# Patient Record
Sex: MALE | Race: WHITE | NOT HISPANIC OR LATINO | Employment: UNEMPLOYED | URBAN - METROPOLITAN AREA
[De-identification: names, ages, dates, MRNs, and addresses within clinical notes are randomized per-mention and may not be internally consistent; named-entity substitution may affect disease eponyms.]

---

## 2024-01-15 ENCOUNTER — HOSPITAL ENCOUNTER (INPATIENT)
Facility: HOSPITAL | Age: 69
LOS: 3 days | Discharge: HOME | DRG: 871 | End: 2024-01-19
Attending: EMERGENCY MEDICINE | Admitting: INTERNAL MEDICINE
Payer: MEDICARE

## 2024-01-15 DIAGNOSIS — L02.31 ABSCESS OF BUTTOCK: ICD-10-CM

## 2024-01-15 DIAGNOSIS — R50.9 FEVER: ICD-10-CM

## 2024-01-15 DIAGNOSIS — I71.43 INFRARENAL ABDOMINAL AORTIC ANEURYSM (AAA) WITHOUT RUPTURE: Chronic | ICD-10-CM

## 2024-01-15 DIAGNOSIS — L03.317 CELLULITIS AND ABSCESS OF BUTTOCK: ICD-10-CM

## 2024-01-15 DIAGNOSIS — J18.9 PNEUMONIA OF LEFT UPPER LOBE DUE TO INFECTIOUS ORGANISM: Primary | ICD-10-CM

## 2024-01-15 DIAGNOSIS — L02.31 CELLULITIS AND ABSCESS OF BUTTOCK: ICD-10-CM

## 2024-01-15 DIAGNOSIS — R07.9 CHEST PAIN: ICD-10-CM

## 2024-01-15 LAB
ALBUMIN SERPL BCP-MCNC: 2.9 G/DL (ref 3.5–5.2)
ALP SERPL-CCNC: 88 U/L (ref 55–135)
ALT SERPL W/O P-5'-P-CCNC: 13 U/L (ref 10–44)
ANION GAP SERPL CALC-SCNC: 12 MMOL/L (ref 8–16)
AST SERPL-CCNC: 14 U/L (ref 10–40)
BASOPHILS # BLD AUTO: 0.07 K/UL (ref 0–0.2)
BASOPHILS NFR BLD: 0.3 % (ref 0–1.9)
BILIRUB SERPL-MCNC: 1.1 MG/DL (ref 0.1–1)
BUN SERPL-MCNC: 12 MG/DL (ref 8–23)
CALCIUM SERPL-MCNC: 9.1 MG/DL (ref 8.7–10.5)
CHLORIDE SERPL-SCNC: 99 MMOL/L (ref 95–110)
CO2 SERPL-SCNC: 21 MMOL/L (ref 23–29)
CREAT SERPL-MCNC: 0.9 MG/DL (ref 0.5–1.4)
CTP QC/QA: YES
CTP QC/QA: YES
DIFFERENTIAL METHOD BLD: ABNORMAL
EOSINOPHIL # BLD AUTO: 0 K/UL (ref 0–0.5)
EOSINOPHIL NFR BLD: 0.2 % (ref 0–8)
ERYTHROCYTE [DISTWIDTH] IN BLOOD BY AUTOMATED COUNT: 13.4 % (ref 11.5–14.5)
EST. GFR  (NO RACE VARIABLE): >60 ML/MIN/1.73 M^2
GLUCOSE SERPL-MCNC: 140 MG/DL (ref 70–110)
HCT VFR BLD AUTO: 36.7 % (ref 40–54)
HGB BLD-MCNC: 12.3 G/DL (ref 14–18)
IMM GRANULOCYTES # BLD AUTO: 0.17 K/UL (ref 0–0.04)
IMM GRANULOCYTES NFR BLD AUTO: 0.7 % (ref 0–0.5)
LACTATE SERPL-SCNC: 1.1 MMOL/L (ref 0.5–2.2)
LYMPHOCYTES # BLD AUTO: 0.9 K/UL (ref 1–4.8)
LYMPHOCYTES NFR BLD: 3.8 % (ref 18–48)
MCH RBC QN AUTO: 30 PG (ref 27–31)
MCHC RBC AUTO-ENTMCNC: 33.5 G/DL (ref 32–36)
MCV RBC AUTO: 90 FL (ref 82–98)
MONOCYTES # BLD AUTO: 1.2 K/UL (ref 0.3–1)
MONOCYTES NFR BLD: 5.1 % (ref 4–15)
NEUTROPHILS # BLD AUTO: 21.1 K/UL (ref 1.8–7.7)
NEUTROPHILS NFR BLD: 89.9 % (ref 38–73)
NRBC BLD-RTO: 0 /100 WBC
PLATELET # BLD AUTO: 374 K/UL (ref 150–450)
PMV BLD AUTO: 10.6 FL (ref 9.2–12.9)
POC MOLECULAR INFLUENZA A AGN: NEGATIVE
POC MOLECULAR INFLUENZA B AGN: NEGATIVE
POTASSIUM SERPL-SCNC: 3.9 MMOL/L (ref 3.5–5.1)
PROCALCITONIN SERPL IA-MCNC: 0.32 NG/ML
PROT SERPL-MCNC: 7.3 G/DL (ref 6–8.4)
RBC # BLD AUTO: 4.1 M/UL (ref 4.6–6.2)
SARS-COV-2 RDRP RESP QL NAA+PROBE: NEGATIVE
SODIUM SERPL-SCNC: 132 MMOL/L (ref 136–145)
WBC # BLD AUTO: 23.41 K/UL (ref 3.9–12.7)

## 2024-01-15 PROCEDURE — 63600175 PHARM REV CODE 636 W HCPCS: Mod: ER | Performed by: FAMILY MEDICINE

## 2024-01-15 PROCEDURE — 87635 SARS-COV-2 COVID-19 AMP PRB: CPT | Mod: ER | Performed by: NURSE PRACTITIONER

## 2024-01-15 PROCEDURE — 85025 COMPLETE CBC W/AUTO DIFF WBC: CPT | Mod: ER | Performed by: NURSE PRACTITIONER

## 2024-01-15 PROCEDURE — 63600175 PHARM REV CODE 636 W HCPCS: Mod: ER | Performed by: EMERGENCY MEDICINE

## 2024-01-15 PROCEDURE — 84145 PROCALCITONIN (PCT): CPT | Mod: ER | Performed by: NURSE PRACTITIONER

## 2024-01-15 PROCEDURE — 25000003 PHARM REV CODE 250: Mod: ER | Performed by: EMERGENCY MEDICINE

## 2024-01-15 PROCEDURE — 83605 ASSAY OF LACTIC ACID: CPT | Mod: ER | Performed by: NURSE PRACTITIONER

## 2024-01-15 PROCEDURE — 87040 BLOOD CULTURE FOR BACTERIA: CPT | Performed by: NURSE PRACTITIONER

## 2024-01-15 PROCEDURE — 63600175 PHARM REV CODE 636 W HCPCS: Performed by: HOSPITALIST

## 2024-01-15 PROCEDURE — G0378 HOSPITAL OBSERVATION PER HR: HCPCS | Mod: ER

## 2024-01-15 PROCEDURE — 25000003 PHARM REV CODE 250: Mod: ER | Performed by: FAMILY MEDICINE

## 2024-01-15 PROCEDURE — 25000003 PHARM REV CODE 250: Mod: ER | Performed by: NURSE PRACTITIONER

## 2024-01-15 PROCEDURE — 0J990ZZ DRAINAGE OF BUTTOCK SUBCUTANEOUS TISSUE AND FASCIA, OPEN APPROACH: ICD-10-PCS | Performed by: INTERNAL MEDICINE

## 2024-01-15 PROCEDURE — 80053 COMPREHEN METABOLIC PANEL: CPT | Mod: ER | Performed by: NURSE PRACTITIONER

## 2024-01-15 PROCEDURE — 63600175 PHARM REV CODE 636 W HCPCS: Performed by: NURSE PRACTITIONER

## 2024-01-15 PROCEDURE — 96372 THER/PROPH/DIAG INJ SC/IM: CPT | Performed by: HOSPITALIST

## 2024-01-15 PROCEDURE — G0378 HOSPITAL OBSERVATION PER HR: HCPCS

## 2024-01-15 RX ORDER — ONDANSETRON HYDROCHLORIDE 2 MG/ML
4 INJECTION, SOLUTION INTRAVENOUS EVERY 8 HOURS PRN
Status: DISCONTINUED | OUTPATIENT
Start: 2024-01-15 | End: 2024-01-19 | Stop reason: HOSPADM

## 2024-01-15 RX ORDER — ENOXAPARIN SODIUM 100 MG/ML
40 INJECTION SUBCUTANEOUS EVERY 24 HOURS
Status: DISCONTINUED | OUTPATIENT
Start: 2024-01-15 | End: 2024-01-19 | Stop reason: HOSPADM

## 2024-01-15 RX ORDER — ACETAMINOPHEN 650 MG/1
650 SUPPOSITORY RECTAL EVERY 6 HOURS PRN
Status: DISCONTINUED | OUTPATIENT
Start: 2024-01-15 | End: 2024-01-19 | Stop reason: HOSPADM

## 2024-01-15 RX ORDER — IBUPROFEN 200 MG
24 TABLET ORAL
Status: DISCONTINUED | OUTPATIENT
Start: 2024-01-15 | End: 2024-01-19 | Stop reason: HOSPADM

## 2024-01-15 RX ORDER — SODIUM CHLORIDE, SODIUM LACTATE, POTASSIUM CHLORIDE, CALCIUM CHLORIDE 600; 310; 30; 20 MG/100ML; MG/100ML; MG/100ML; MG/100ML
INJECTION, SOLUTION INTRAVENOUS CONTINUOUS
Status: DISCONTINUED | OUTPATIENT
Start: 2024-01-15 | End: 2024-01-16

## 2024-01-15 RX ORDER — SODIUM CHLORIDE 9 MG/ML
INJECTION, SOLUTION INTRAVENOUS CONTINUOUS
Status: DISCONTINUED | OUTPATIENT
Start: 2024-01-15 | End: 2024-01-16

## 2024-01-15 RX ORDER — HYDROCODONE BITARTRATE AND ACETAMINOPHEN 5; 325 MG/1; MG/1
1 TABLET ORAL EVERY 6 HOURS PRN
Status: DISCONTINUED | OUTPATIENT
Start: 2024-01-15 | End: 2024-01-19 | Stop reason: HOSPADM

## 2024-01-15 RX ORDER — IBUPROFEN 200 MG
16 TABLET ORAL
Status: DISCONTINUED | OUTPATIENT
Start: 2024-01-15 | End: 2024-01-19 | Stop reason: HOSPADM

## 2024-01-15 RX ORDER — ACETAMINOPHEN 325 MG/1
650 TABLET ORAL EVERY 8 HOURS PRN
Status: DISCONTINUED | OUTPATIENT
Start: 2024-01-15 | End: 2024-01-19 | Stop reason: HOSPADM

## 2024-01-15 RX ORDER — SODIUM CHLORIDE 0.9 % (FLUSH) 0.9 %
10 SYRINGE (ML) INJECTION EVERY 12 HOURS PRN
Status: DISCONTINUED | OUTPATIENT
Start: 2024-01-15 | End: 2024-01-19 | Stop reason: HOSPADM

## 2024-01-15 RX ORDER — NALOXONE HCL 0.4 MG/ML
0.02 VIAL (ML) INJECTION
Status: DISCONTINUED | OUTPATIENT
Start: 2024-01-15 | End: 2024-01-19 | Stop reason: HOSPADM

## 2024-01-15 RX ORDER — GLUCAGON 1 MG
1 KIT INJECTION
Status: DISCONTINUED | OUTPATIENT
Start: 2024-01-15 | End: 2024-01-19 | Stop reason: HOSPADM

## 2024-01-15 RX ORDER — ACETAMINOPHEN 325 MG/1
650 TABLET ORAL
Status: COMPLETED | OUTPATIENT
Start: 2024-01-15 | End: 2024-01-15

## 2024-01-15 RX ORDER — ALUMINUM HYDROXIDE, MAGNESIUM HYDROXIDE, AND SIMETHICONE 1200; 120; 1200 MG/30ML; MG/30ML; MG/30ML
30 SUSPENSION ORAL 4 TIMES DAILY PRN
Status: DISCONTINUED | OUTPATIENT
Start: 2024-01-15 | End: 2024-01-19 | Stop reason: HOSPADM

## 2024-01-15 RX ORDER — AMOXICILLIN 250 MG
1 CAPSULE ORAL 2 TIMES DAILY PRN
Status: DISCONTINUED | OUTPATIENT
Start: 2024-01-15 | End: 2024-01-19 | Stop reason: HOSPADM

## 2024-01-15 RX ORDER — LEVOFLOXACIN 5 MG/ML
750 INJECTION, SOLUTION INTRAVENOUS
Status: DISCONTINUED | OUTPATIENT
Start: 2024-01-15 | End: 2024-01-15

## 2024-01-15 RX ORDER — TALC
6 POWDER (GRAM) TOPICAL NIGHTLY PRN
Status: DISCONTINUED | OUTPATIENT
Start: 2024-01-15 | End: 2024-01-17

## 2024-01-15 RX ORDER — LEVOFLOXACIN 5 MG/ML
750 INJECTION, SOLUTION INTRAVENOUS
Status: COMPLETED | OUTPATIENT
Start: 2024-01-15 | End: 2024-01-15

## 2024-01-15 RX ORDER — PROMETHAZINE HYDROCHLORIDE 25 MG/1
25 TABLET ORAL EVERY 6 HOURS PRN
Status: DISCONTINUED | OUTPATIENT
Start: 2024-01-15 | End: 2024-01-19 | Stop reason: HOSPADM

## 2024-01-15 RX ORDER — LIDOCAINE HYDROCHLORIDE 10 MG/ML
10 INJECTION, SOLUTION EPIDURAL; INFILTRATION; INTRACAUDAL; PERINEURAL
Status: COMPLETED | OUTPATIENT
Start: 2024-01-15 | End: 2024-01-15

## 2024-01-15 RX ORDER — IPRATROPIUM BROMIDE AND ALBUTEROL SULFATE 2.5; .5 MG/3ML; MG/3ML
3 SOLUTION RESPIRATORY (INHALATION) EVERY 6 HOURS PRN
Status: DISCONTINUED | OUTPATIENT
Start: 2024-01-15 | End: 2024-01-19 | Stop reason: HOSPADM

## 2024-01-15 RX ORDER — MORPHINE SULFATE 2 MG/ML
2 INJECTION, SOLUTION INTRAMUSCULAR; INTRAVENOUS EVERY 4 HOURS PRN
Status: DISCONTINUED | OUTPATIENT
Start: 2024-01-15 | End: 2024-01-19 | Stop reason: HOSPADM

## 2024-01-15 RX ADMIN — LIDOCAINE HYDROCHLORIDE 100 MG: 10 INJECTION, SOLUTION EPIDURAL; INFILTRATION; INTRACAUDAL at 12:01

## 2024-01-15 RX ADMIN — LEVOFLOXACIN 750 MG: 750 INJECTION, SOLUTION INTRAVENOUS at 10:01

## 2024-01-15 RX ADMIN — SODIUM CHLORIDE: 9 INJECTION, SOLUTION INTRAVENOUS at 04:01

## 2024-01-15 RX ADMIN — VANCOMYCIN HYDROCHLORIDE 1000 MG: 1 INJECTION, POWDER, LYOPHILIZED, FOR SOLUTION INTRAVENOUS at 02:01

## 2024-01-15 RX ADMIN — ENOXAPARIN SODIUM 40 MG: 40 INJECTION SUBCUTANEOUS at 09:01

## 2024-01-15 RX ADMIN — VANCOMYCIN HYDROCHLORIDE 1000 MG: 1 INJECTION, POWDER, LYOPHILIZED, FOR SOLUTION INTRAVENOUS at 06:01

## 2024-01-15 RX ADMIN — SODIUM CHLORIDE, POTASSIUM CHLORIDE, SODIUM LACTATE AND CALCIUM CHLORIDE: 600; 310; 30; 20 INJECTION, SOLUTION INTRAVENOUS at 09:01

## 2024-01-15 RX ADMIN — ACETAMINOPHEN 650 MG: 325 TABLET ORAL at 12:01

## 2024-01-15 NOTE — CARE UPDATE
[facility] ED Transfer of Care Note       Referring facility:  [facility]  Referring provider: Dr. Bailey/Jem Vasquez NP  Accepting facility: [facility]  Accepting provider: Dr. Soco Gilbert  Admitting provider:   Soco Gilbert MD  Reason for transfer:  IV antibiotics  Transfer diagnosis: Pneumonia and buttock abscess  Transfer specialty requested: Hospital medicine  Transfer specialty notified: Yes  Transfer level:   Bed type requested: Standard  Isolation status: No active isolations   Admission class or status:       Narrative     Mr. Becker is a 69 y/o male in [facility] ED presents with reports of Fever, temp max 101 at home.  He was evaluated here in the ER yesterday for buttock abscess, underwent I&D.  States he developed fever today.  He also reports a cough that has been ongoing over last couple of days.  Chest x-ray reveals left upper lobe pneumonia.  Initial vital signs b/p 133/69, t 100.5, ,RR 24 , O2 sat 92% on room air.  Most recent O2 sat 94% on room air.  5-10 cigarettes/day smoker.  WBC 23.41, procalcitonin 0.32.     Patient is started on Vanc/Levaquin.  Awaiting updated home medications.    Objective     Vitals: Temp: (!) 100.5 °F (38.1 °C) (01/15/24 1259)  Pulse: 88 (01/15/24 1454)  Resp: (S) (!) 24 (Hx of smoker.) (01/15/24 1218)  BP: 114/62 (01/15/24 1454)  SpO2: (!) 94 % (01/15/24 1455)  Recent Labs: All pertinent labs within the past 24 hours have been reviewed.  Recent Lab Results         01/15/24  1421   01/15/24  1420   01/15/24  1313        POC Molecular Influenza A Ag Negative           POC Molecular Influenza B Ag Negative           Procalcitonin     0.32  Comment: A concentration < 0.25 ng/mL represents a low risk of bacterial   infection.  Procalcitonin may not be accurate among patients with localized   infection, recent trauma or major surgery, immunosuppressed state,   invasive fungal infection, renal dysfunction. Decisions regarding   initiation or continuation of antibiotic  therapy should not be based   solely on procalcitonin levels.         Albumin     2.9       ALP     88       ALT     13       Anion Gap     12       AST     14       Baso #     0.07       Basophil %     0.3       BILIRUBIN TOTAL     1.1  Comment: For infants and newborns, interpretation of results should be based  on gestational age, weight and in agreement with clinical  observations.    Premature Infant recommended reference ranges:  Up to 24 hours.............<8.0 mg/dL  Up to 48 hours............<12.0 mg/dL  3-5 days..................<15.0 mg/dL  6-29 days.................<15.0 mg/dL         BUN     12       Calcium     9.1       Chloride     99       CO2     21       Creatinine     0.9       Differential Method     Automated       eGFR     >60.0       Eos #     0.0       Eosinophil %     0.2       Glucose     140       Gran # (ANC)     21.1       Gran %     89.9       Hematocrit     36.7       Hemoglobin     12.3       Immature Grans (Abs)     0.17  Comment: Mild elevation in immature granulocytes is non specific and   can be seen in a variety of conditions including stress response,   acute inflammation, trauma and pregnancy. Correlation with other   laboratory and clinical findings is essential.         Immature Granulocytes     0.7       Lactate, Hever     1.1  Comment: Falsely low lactic acid results can be found in samples   containing >=13.0 mg/dL total bilirubin and/or >=3.5 mg/dL   direct bilirubin.         Lymph #     0.9       Lymph %     3.8       MCH     30.0       MCHC     33.5       MCV     90       Mono #     1.2       Mono %     5.1       MPV     10.6       nRBC     0       Platelet Count     374       Potassium     3.9       PROTEIN TOTAL     7.3        Acceptable Yes   Yes         RBC     4.10       RDW     13.4       SARS-CoV-2 RNA, Amplification, Qual   Negative         Sodium     132       WBC     23.41             Recent imaging:    X-Ray Chest PA And Lateral   Final Result       1. There has been interval development of a lingular pneumonia.   2. There has been interval development of a small left pleural effusion.   .         Electronically signed by: Fracisco Flor MD   Date:    01/15/2024   Time:    14:20          Airway:     Vent settings:         IV access:        Peripheral IV - Single Lumen 01/15/24 1305 20 G Left Antecubital (Active)   Site Assessment Clean;Dry;Intact;No redness;No swelling 01/15/24 1309   Extremity Assessment Distal to IV No abnormal discoloration;No redness;No swelling;No warmth 01/15/24 1309   Line Status Flushed;Blood return noted 01/15/24 1309   Dressing Status Clean;Dry;Intact 01/15/24 1309   Dressing Intervention First dressing 01/15/24 1309     Infusions: none  Allergies: Review of patient's allergies indicates:  No Known Allergies   NPO: No    Anticoagulation:   Anticoagulants       None             Instructions      [facility]  Admit to Hospital Medicine  Upon patient arrival to floor, please contact Hospital Medicine on call.

## 2024-01-15 NOTE — ED NOTES
First dose of Vanc has not been discontinued although it appears as so. Per Kaitlin in pharmacy, medication order was linked. RN had to waste original dose. Therefore additional order had to be placed.

## 2024-01-15 NOTE — ED PROVIDER NOTES
Encounter Date: 1/15/2024       History     Chief Complaint   Patient presents with    Fever     Seen here yesterday for abscess; states he does not have fever reducing medication to take.     Patient presents to ER for fever, onset today.  Associated symptoms include chills.  States he was evaluated here in the ER yesterday for an abscess to right buttock and had I and D performed..  He reports fever at home of 101.  Has not taken any fever reducing medications today.  He denies chest pain, shortness of breath, weakness, fatigue, abdominal pain.    The history is provided by the patient.     Review of patient's allergies indicates:  No Known Allergies  Past Medical History:   Diagnosis Date    AAA (abdominal aortic aneurysm)     W/ REPAIR     No past surgical history on file.  No family history on file.     Review of Systems   Constitutional:  Positive for chills and fever. Negative for fatigue.   HENT:  Negative for congestion, rhinorrhea, sinus pain and sore throat.    Eyes:  Negative for pain.   Respiratory:  Positive for cough. Negative for shortness of breath.    Cardiovascular:  Negative for chest pain.   Gastrointestinal:  Negative for abdominal pain, nausea and vomiting.   Genitourinary:  Negative for dysuria.   Musculoskeletal:  Negative for back pain, myalgias and neck pain.   Skin:  Negative for rash.        +abscess   Neurological:  Negative for weakness and headaches.       Physical Exam     Initial Vitals   BP Pulse Resp Temp SpO2   01/15/24 1218 01/15/24 1218 01/15/24 1217 01/15/24 1218 01/15/24 1218   133/69 109 (!) 24 (!) 100.5 °F (38.1 °C) (S) (!) 91 %      MAP       --                Physical Exam    Nursing note and vitals reviewed.  Constitutional: He is not diaphoretic. He is cooperative.  Non-toxic appearance. No distress.   HENT:   Head: Normocephalic and atraumatic.   Nose: Nose normal.   Mouth/Throat: Oropharynx is clear and moist.   Eyes: Conjunctivae are normal.   Neck: Neck supple.    Normal range of motion.  Cardiovascular:  Regular rhythm and intact distal pulses.           +tachycardia 109 bpm   Pulmonary/Chest: Breath sounds normal. No respiratory distress.   Abdominal: Abdomen is soft. There is no abdominal tenderness.   Musculoskeletal:         General: Normal range of motion.      Cervical back: Normal range of motion and neck supple.     Neurological: He is alert and oriented to person, place, and time. He has normal strength. No sensory deficit.   Skin: Skin is warm and dry. Capillary refill takes less than 2 seconds. Abscess noted.   + area of erythema to right inner buttock with +induration.  Gauze packing is present to previous I&D site from yesterday, upon removal of gauze packing, there is mild amount of purulent drainage expressed upon palpation.  +tenderness upon palpation.         ED Course   I & D - Incision and Drainage    Date/Time: 1/15/2024 1:45 PM  Location procedure was performed: [facility] EMERGENCY DEPARTMENT    Performed by: Jem Vasquez NP  Authorized by: Jem Vasquez NP  Type: abscess  Location: Right buttock.  Anesthesia: local infiltration    Anesthesia:  Local Anesthetic: lidocaine 1% without epinephrine  Anesthetic total: 4 mL    Patient sedated: no  Scalpel size: 11  Incision type: single straight  Complexity: simple  Drainage: bloody  Drainage amount: Mild.  Wound treatment: incision, drainage and wound packed  Packing material: 1/4 in gauze  Complications: No  Patient tolerance: Patient tolerated the procedure well with no immediate complications        Labs Reviewed   CBC W/ AUTO DIFFERENTIAL - Abnormal; Notable for the following components:       Result Value    WBC 23.41 (*)     RBC 4.10 (*)     Hemoglobin 12.3 (*)     Hematocrit 36.7 (*)     Immature Granulocytes 0.7 (*)     Gran # (ANC) 21.1 (*)     Immature Grans (Abs) 0.17 (*)     Lymph # 0.9 (*)     Mono # 1.2 (*)     Gran % 89.9 (*)     Lymph % 3.8 (*)     All other components within normal limits    COMPREHENSIVE METABOLIC PANEL - Abnormal; Notable for the following components:    Sodium 132 (*)     CO2 21 (*)     Glucose 140 (*)     Albumin 2.9 (*)     Total Bilirubin 1.1 (*)     All other components within normal limits   PROCALCITONIN - Abnormal; Notable for the following components:    Procalcitonin 0.32 (*)     All other components within normal limits   CULTURE, BLOOD   CULTURE, BLOOD   LACTIC ACID, PLASMA   POCT INFLUENZA A/B MOLECULAR   SARS-COV-2 RDRP GENE    Narrative:     .This test utilizes isothermal nucleic acid amplification technology to detect the SARS-CoV-2 RdRp nucleic acid segment. The analytical sensitivity (limit of detection) is 500 copies/swab.     A POSITIVE result is indicative of the presence of SARS-CoV-2 RNA; clinical correlation with patient history and other diagnostic information is necessary to determine patient infection status.    A NEGATIVE result means that SARS-CoV-2 nucleic acids are not present above the limit of detection. A NEGATIVE result should be treated as presumptive. It does not rule out the possibility of COVID-19 and should not be the sole basis for treatment decisions. If COVID-19 is strongly suspected based on clinical and exposure history, re-testing using an alternate molecular assay should be considered.     This test is only for use under the Food and Drug Administration s Emergency Use Authorization (EUA).     Commercial kits are provided by [employer]. Performance characteristics of the EUA have been independently verified by [facility] Department of Pathology and Laboratory Medicine.   _________________________________________________________________   The authorized Fact Sheet for Healthcare Providers and the authorized Fact Sheet for Patients of the ID NOW COVID-19 are available on the FDA website:    https://www.fda.gov/media/544781/download      https://www.fda.gov/media/930092/download  \        Results for orders placed or  performed during the hospital encounter of 01/15/24   CBC auto differential   Result Value Ref Range    WBC 23.41 (H) 3.90 - 12.70 K/uL    RBC 4.10 (L) 4.60 - 6.20 M/uL    Hemoglobin 12.3 (L) 14.0 - 18.0 g/dL    Hematocrit 36.7 (L) 40.0 - 54.0 %    MCV 90 82 - 98 fL    MCH 30.0 27.0 - 31.0 pg    MCHC 33.5 32.0 - 36.0 g/dL    RDW 13.4 11.5 - 14.5 %    Platelets 374 150 - 450 K/uL    MPV 10.6 9.2 - 12.9 fL    Immature Granulocytes 0.7 (H) 0.0 - 0.5 %    Gran # (ANC) 21.1 (H) 1.8 - 7.7 K/uL    Immature Grans (Abs) 0.17 (H) 0.00 - 0.04 K/uL    Lymph # 0.9 (L) 1.0 - 4.8 K/uL    Mono # 1.2 (H) 0.3 - 1.0 K/uL    Eos # 0.0 0.0 - 0.5 K/uL    Baso # 0.07 0.00 - 0.20 K/uL    nRBC 0 0 /100 WBC    Gran % 89.9 (H) 38.0 - 73.0 %    Lymph % 3.8 (L) 18.0 - 48.0 %    Mono % 5.1 4.0 - 15.0 %    Eosinophil % 0.2 0.0 - 8.0 %    Basophil % 0.3 0.0 - 1.9 %    Differential Method Automated    Comprehensive metabolic panel   Result Value Ref Range    Sodium 132 (L) 136 - 145 mmol/L    Potassium 3.9 3.5 - 5.1 mmol/L    Chloride 99 95 - 110 mmol/L    CO2 21 (L) 23 - 29 mmol/L    Glucose 140 (H) 70 - 110 mg/dL    BUN 12 8 - 23 mg/dL    Creatinine 0.9 0.5 - 1.4 mg/dL    Calcium 9.1 8.7 - 10.5 mg/dL    Total Protein 7.3 6.0 - 8.4 g/dL    Albumin 2.9 (L) 3.5 - 5.2 g/dL    Total Bilirubin 1.1 (H) 0.1 - 1.0 mg/dL    Alkaline Phosphatase 88 55 - 135 U/L    AST 14 10 - 40 U/L    ALT 13 10 - 44 U/L    eGFR >60.0 >60 mL/min/1.73 m^2    Anion Gap 12 8 - 16 mmol/L   Lactic acid, plasma #1   Result Value Ref Range    Lactate (Lactic Acid) 1.1 0.5 - 2.2 mmol/L   Procalcitonin   Result Value Ref Range    Procalcitonin 0.32 (H) <0.25 ng/mL   POCT Influenza A/B Molecular   Result Value Ref Range    POC Molecular Influenza A Ag Negative Negative, Not Reported    POC Molecular Influenza B Ag Negative Negative, Not Reported     Acceptable Yes    POCT COVID-19 Rapid Screening   Result Value Ref Range    POC Rapid COVID Negative Negative    Quality  Control Acceptable Yes          Imaging Results              X-Ray Chest PA And Lateral (Final result)  Result time 01/15/24 14:20:57      Final result by NANCIE Flor Sr., MD (01/15/24 14:20:57)                   Impression:      1. There has been interval development of a lingular pneumonia.  2. There has been interval development of a small left pleural effusion.  .      Electronically signed by: Fracisco Flor MD  Date:    01/15/2024  Time:    14:20               Narrative:    EXAMINATION:  XR CHEST PA AND LATERAL    CLINICAL HISTORY:  Fever, unspecified    COMPARISON:  None    FINDINGS:  The size of the heart is normal.  There has been interval development of a moderate amount of alveolar consolidation in the lingula.  There has been interval development of a small left pleural effusion.  There is no focal pulmonary infiltrate visualized in the right lung.  There is no pneumothorax.                                       Medications   vancomycin - pharmacy to dose (has no administration in time range)   vancomycin (VANCOCIN) 1,000 mg in dextrose 5 % (D5W) 250 mL IVPB (Vial-Mate) (1,000 mg Intravenous New Bag 1/15/24 1454)     Followed by   vancomycin (VANCOCIN) 1,000 mg in dextrose 5 % (D5W) 250 mL IVPB (Vial-Mate) (has no administration in time range)   vancomycin (VANCOCIN) 1,000 mg in dextrose 5 % (D5W) 250 mL IVPB (Vial-Mate) (has no administration in time range)   levoFLOXacin 750 mg/150 mL IVPB 750 mg (has no administration in time range)   0.9%  NaCl infusion (has no administration in time range)   LIDOcaine (PF) 10 mg/ml (1%) injection 100 mg (100 mg Infiltration Given 1/15/24 1258)   acetaminophen tablet 650 mg (650 mg Oral Given 1/15/24 1259)     Medical Decision Making  Amount and/or Complexity of Data Reviewed  Labs: ordered.  Radiology: ordered.    Risk  OTC drugs.  Prescription drug management.    Differential Dx: sepsis, cough, pneumonia, skin abscess, cellulitis                 I and D  performed today to increase the size of the incision made from yesterday's I&D.  Patient tolerated well without complication.  Wound repacked with gauze dressing.           3:05 PM.  Discussed all results with patient and he verbalizes understanding.  Instructed patient on the need for hospital admission for further treatment of pneumonia and abscess with IV antibiotics.  Patient ultimately agrees with this plan. The patient is resting comfortably and is in no acute distress. Informed patient and family that hospital admission services are not available at this facility. Notified of test results and need of transfer to another facility with available services. Patient has requested to be transferred to [facility].  The patient understands and agrees with the plan as discussed. Answered questions at this time.      Patient Condition:  [x]The patient has been stabilized such that, within reasonable medical probability, no material deterioration of the patients condition is likely to result from transfer    Transfer Requirements:   [x]The receiving facility, [facility], has available space and qualified personnel for treatment as acknowledged by Dr. Gilbert.   [x]The receiving physician, Dr. Gilbert, has agreed to accept transfer and to provide appropriate medical treatment.  [x]Appropriate medical records of the examination and treatment of the patient will be provided at the time of transfer.  [x]The patient will be transferred via [facility] by qualified personnel and transportation equipment as required, including the use of necessary and medically appropriate life support measures.  [x]The patient has a verbal understanding of the need for tranfer and agrees with the plan as discussed.              Clinical Impression:  Final diagnoses:  [R50.9] Fever  [J18.9] Pneumonia of left upper lobe due to infectious organism (Primary)  [L02.31] Abscess of buttock          ED Disposition Condition    Observation  Jem Clay, NP  01/15/24 1512

## 2024-01-16 PROBLEM — L02.31 CELLULITIS AND ABSCESS OF BUTTOCK: Status: ACTIVE | Noted: 2024-01-16

## 2024-01-16 PROBLEM — R50.9 FEVER: Status: RESOLVED | Noted: 2024-01-16 | Resolved: 2024-01-16

## 2024-01-16 PROBLEM — J18.9 PNEUMONIA: Status: ACTIVE | Noted: 2024-01-16

## 2024-01-16 PROBLEM — L03.317 CELLULITIS AND ABSCESS OF BUTTOCK: Status: ACTIVE | Noted: 2024-01-16

## 2024-01-16 PROBLEM — R06.00 DYSPNEA: Status: ACTIVE | Noted: 2024-01-16

## 2024-01-16 PROBLEM — R50.9 FEVER: Status: ACTIVE | Noted: 2024-01-16

## 2024-01-16 PROBLEM — A41.9 SEPSIS: Status: ACTIVE | Noted: 2024-01-16

## 2024-01-16 LAB
ALBUMIN SERPL BCP-MCNC: 2.6 G/DL (ref 3.5–5.2)
ALP SERPL-CCNC: 82 U/L (ref 55–135)
ALT SERPL W/O P-5'-P-CCNC: 10 U/L (ref 10–44)
ANION GAP SERPL CALC-SCNC: 12 MMOL/L (ref 8–16)
AST SERPL-CCNC: 12 U/L (ref 10–40)
BASOPHILS # BLD AUTO: 0.09 K/UL (ref 0–0.2)
BASOPHILS NFR BLD: 0.4 % (ref 0–1.9)
BILIRUB SERPL-MCNC: 1.1 MG/DL (ref 0.1–1)
BUN SERPL-MCNC: 10 MG/DL (ref 8–23)
CALCIUM SERPL-MCNC: 9 MG/DL (ref 8.7–10.5)
CHLORIDE SERPL-SCNC: 100 MMOL/L (ref 95–110)
CO2 SERPL-SCNC: 22 MMOL/L (ref 23–29)
CREAT SERPL-MCNC: 0.9 MG/DL (ref 0.5–1.4)
DIFFERENTIAL METHOD BLD: ABNORMAL
EOSINOPHIL # BLD AUTO: 0.1 K/UL (ref 0–0.5)
EOSINOPHIL NFR BLD: 0.4 % (ref 0–8)
ERYTHROCYTE [DISTWIDTH] IN BLOOD BY AUTOMATED COUNT: 13.3 % (ref 11.5–14.5)
EST. GFR  (NO RACE VARIABLE): >60 ML/MIN/1.73 M^2
GLUCOSE SERPL-MCNC: 102 MG/DL (ref 70–110)
HCT VFR BLD AUTO: 36 % (ref 40–54)
HGB BLD-MCNC: 12.2 G/DL (ref 14–18)
IMM GRANULOCYTES # BLD AUTO: 0.13 K/UL (ref 0–0.04)
IMM GRANULOCYTES NFR BLD AUTO: 0.6 % (ref 0–0.5)
LYMPHOCYTES # BLD AUTO: 1.3 K/UL (ref 1–4.8)
LYMPHOCYTES NFR BLD: 5.4 % (ref 18–48)
MCH RBC QN AUTO: 30 PG (ref 27–31)
MCHC RBC AUTO-ENTMCNC: 33.9 G/DL (ref 32–36)
MCV RBC AUTO: 89 FL (ref 82–98)
MONOCYTES # BLD AUTO: 1.5 K/UL (ref 0.3–1)
MONOCYTES NFR BLD: 6.4 % (ref 4–15)
NEUTROPHILS # BLD AUTO: 20.3 K/UL (ref 1.8–7.7)
NEUTROPHILS NFR BLD: 86.8 % (ref 38–73)
NRBC BLD-RTO: 0 /100 WBC
PLATELET # BLD AUTO: 339 K/UL (ref 150–450)
PMV BLD AUTO: 11.3 FL (ref 9.2–12.9)
POTASSIUM SERPL-SCNC: 4.3 MMOL/L (ref 3.5–5.1)
PROT SERPL-MCNC: 7.2 G/DL (ref 6–8.4)
RBC # BLD AUTO: 4.06 M/UL (ref 4.6–6.2)
SODIUM SERPL-SCNC: 134 MMOL/L (ref 136–145)
WBC # BLD AUTO: 23.36 K/UL (ref 3.9–12.7)

## 2024-01-16 PROCEDURE — 63600175 PHARM REV CODE 636 W HCPCS: Performed by: HOSPITALIST

## 2024-01-16 PROCEDURE — 25000003 PHARM REV CODE 250: Performed by: HOSPITALIST

## 2024-01-16 PROCEDURE — 63600175 PHARM REV CODE 636 W HCPCS: Performed by: FAMILY MEDICINE

## 2024-01-16 PROCEDURE — 36415 COLL VENOUS BLD VENIPUNCTURE: CPT | Performed by: HOSPITALIST

## 2024-01-16 PROCEDURE — 25000003 PHARM REV CODE 250: Performed by: FAMILY MEDICINE

## 2024-01-16 PROCEDURE — 80053 COMPREHEN METABOLIC PANEL: CPT | Performed by: HOSPITALIST

## 2024-01-16 PROCEDURE — 25000003 PHARM REV CODE 250: Performed by: INTERNAL MEDICINE

## 2024-01-16 PROCEDURE — 85025 COMPLETE CBC W/AUTO DIFF WBC: CPT | Performed by: HOSPITALIST

## 2024-01-16 PROCEDURE — 94799 UNLISTED PULMONARY SVC/PX: CPT | Mod: XB

## 2024-01-16 PROCEDURE — 99900035 HC TECH TIME PER 15 MIN (STAT)

## 2024-01-16 PROCEDURE — 11000001 HC ACUTE MED/SURG PRIVATE ROOM

## 2024-01-16 RX ORDER — BENZONATATE 100 MG/1
100 CAPSULE ORAL 3 TIMES DAILY PRN
Status: DISCONTINUED | OUTPATIENT
Start: 2024-01-16 | End: 2024-01-19 | Stop reason: HOSPADM

## 2024-01-16 RX ORDER — GUAIFENESIN 600 MG/1
600 TABLET, EXTENDED RELEASE ORAL 2 TIMES DAILY
Status: DISCONTINUED | OUTPATIENT
Start: 2024-01-16 | End: 2024-01-17

## 2024-01-16 RX ORDER — LEVOFLOXACIN 5 MG/ML
750 INJECTION, SOLUTION INTRAVENOUS
Status: DISCONTINUED | OUTPATIENT
Start: 2024-01-16 | End: 2024-01-18

## 2024-01-16 RX ADMIN — GUAIFENESIN 600 MG: 600 TABLET, EXTENDED RELEASE ORAL at 12:01

## 2024-01-16 RX ADMIN — GUAIFENESIN 600 MG: 600 TABLET, EXTENDED RELEASE ORAL at 08:01

## 2024-01-16 RX ADMIN — ACETAMINOPHEN 650 MG: 325 TABLET ORAL at 05:01

## 2024-01-16 RX ADMIN — VANCOMYCIN HYDROCHLORIDE 2000 MG: 10 INJECTION, POWDER, LYOPHILIZED, FOR SOLUTION INTRAVENOUS at 08:01

## 2024-01-16 RX ADMIN — LEVOFLOXACIN 750 MG: 750 INJECTION, SOLUTION INTRAVENOUS at 11:01

## 2024-01-16 RX ADMIN — ACETAMINOPHEN 650 MG: 325 TABLET ORAL at 09:01

## 2024-01-16 RX ADMIN — ENOXAPARIN SODIUM 40 MG: 40 INJECTION SUBCUTANEOUS at 05:01

## 2024-01-16 NOTE — CONSULTS
"Pharmacokinetic Initial Assessment: IV Vancomycin    Assessment/Plan:    Initiate intravenous vancomycin with loading dose of 2000 mg once followed by a maintenance dose of vancomycin 2000 mg IV every 24 hours  Desired empiric serum trough concentration is 15 to 20 mcg/mL  Draw vancomycin trough level 60 min prior to third dose on 1/17/24 at approximately 1530.  Pharmacy will continue to follow and monitor vancomycin.    Please contact pharmacy at extension [phone] for questions regarding this assessment.    Thank you for the consult,   Marisela OsheaD       Patient brief summary:  Dallas Becker is a 68 y.o. male initiated on antimicrobial therapy with IV Vancomycin for treatment of suspected skin & soft tissue infection: Abscess of buttock s/p I&D 1/14/24; Lower respiratory infection    Drug Allergies:   Review of patient's allergies indicates:  No Known Allergies    Actual Body Weight: 79.5 kg    Renal Function:   Estimated Creatinine Clearance: 88.3 mL/min (based on SCr of 0.9 mg/dL).,     Dialysis Method (if applicable):  N/A    CBC (last 72 hours):  Recent Labs   Lab Result Units 01/15/24  1313   WBC K/uL 23.41*   Hemoglobin g/dL 12.3*   Hematocrit % 36.7*   Platelets K/uL 374   Gran % % 89.9*   Lymph % % 3.8*   Mono % % 5.1   Eosinophil % % 0.2   Basophil % % 0.3   Differential Method  Automated       Metabolic Panel (last 72 hours):  Recent Labs   Lab Result Units 01/15/24  1313   Sodium mmol/L 132*   Potassium mmol/L 3.9   Chloride mmol/L 99   CO2 mmol/L 21*   Glucose mg/dL 140*   BUN mg/dL 12   Creatinine mg/dL 0.9   Albumin g/dL 2.9*   Total Bilirubin mg/dL 1.1*   Alkaline Phosphatase U/L 88   AST U/L 14   ALT U/L 13       Drug levels (last 3 results):  No results for input(s): "VANCOMYCINRA", "VANCORANDOM", "VANCOMYCINPE", "VANCOPEAK", "VANCOMYCINTR", "VANCOTROUGH" in the last 72 hours.    Microbiologic Results:  Microbiology Results (last 7 days)       Procedure Component Value Units Date/Time "    Blood culture x two cultures. Draw prior to antibiotics. [[id number]] Collected: 01/15/24 1312    Order Status: Sent Specimen: Blood from Peripheral, Hand, Left Updated: 01/15/24 1313    Blood culture x two cultures. Draw prior to antibiotics. [[id number]] Collected: 01/15/24 1305    Order Status: Sent Specimen: Blood from Peripheral, Antecubital, Left Updated: 01/15/24 1311          Thank you for allowing us to participate in this patient's care.     Barb Berman PharmD 01/15/2024 7:14 PM

## 2024-01-16 NOTE — HOSPITAL COURSE
Continued on IV Abx for PNA and buttock abscess. Wound care consulted and following. CT Pelvis 01/17 showed no residual drainable abscess. PT had slow improvement in leukocytosis, was transitioned from IV Vanc + Ceftriaxone to PO Doxycycline + Augmentin for coverage of CAP and buttock abscess prior to discharge.     Pt seen and examined on day of discharge. He reports improvement in cough, was able to be weaned off supplemental oxygen. Buttock wound appears smaller in size, no residual induration noted, scant amount of drainage.Pt appears stable for discharge home today per my exam.     Followup with PCP scheduled on 01/22/24. Ambulatory referral sent to Vascular Surgery to establish care for management of AAA. [facility] at home referral.

## 2024-01-16 NOTE — PROGRESS NOTES
[facility] Medicine  Progress Note    Patient Name: Dallas Becker  MRN: [id number]  Patient Class: IP- Inpatient   Admission Date: 1/15/2024  Length of Stay: 0 days  Attending Physician: Magalys Mcgraw MD  Primary Care Provider: Lalita Primary Doctor        Subjective:     Principal Problem:Fever        HPI:  Dallas Becker is a 68 y.o. male with a PMH  has a past medical history of AAA (abdominal aortic aneurysm). who presented as a transfer from [facility] for higher level of care for treatment of fever and pneumonia. Patient initially seen in ED day prior and underwent I&D of buttocks abscess but presented back to the ED earlier today after developing acute onset fever with T-max measuring 101.0 at home. Patient reported no known alleviating or aggravating factors noted with associated symptoms including dyspnea, yellow/green productive cough, and chest pain with inspiration which progressively worsened over the past few days.  Patient reported having no significant past medical history and denied exposure to ill contacts, recent hospitalizations, recent travel, and denies use of home O2, nebulizers, CPAP, or underlying lung disease.  Initial workup in the ED revealed patient met SIRS criteria in setting of underlying pneumonia and recent buttocks abscess and was admitted to Hospital Medicine under observation for continued medical management.    PCP: Lalita Primary Doctor      Overview/Hospital Course:  Continued on IV Abx for PNA and buttock abscess. Wound care consulted     Interval History: Pt admitted overnight for management of PNA and buttock abscess. Pt reports cough productive of green/yellow sputum and pain over R buttock. Denies CP, SOB.     Review of Systems  Objective:     Vital Signs (Most Recent):  Temp: 98.8 °F (37.1 °C) (01/16/24 0818)  Pulse: 88 (01/16/24 0818)  Resp: 16 (01/16/24 0818)  BP: (!) 116/59 (01/16/24 0818)  SpO2: (!) 90 % (01/16/24 0818) Vital Signs (24h  Range):  Temp:  [97.6 °F (36.4 °C)-100.5 °F (38.1 °C)] 98.8 °F (37.1 °C)  Pulse:  [] 88  Resp:  [16-24] 16  SpO2:  [87 %-95 %] 90 %  BP: ()/(59-69) 116/59     Weight: 79.5 kg (175 lb 4.3 oz)  Body mass index is 23.12 kg/m².  No intake or output data in the 24 hours ending 01/16/24 1117      Physical Exam  Vitals and nursing note reviewed.   Constitutional:       General: He is not in acute distress.     Appearance: Ill appearance: chronically ill appearing.   Cardiovascular:      Rate and Rhythm: Normal rate and regular rhythm.   Pulmonary:      Breath sounds: No wheezing, rhonchi or rales.      Comments: On room air   Abdominal:      General: Bowel sounds are normal. There is no distension.      Palpations: Abdomen is soft.      Tenderness: There is no abdominal tenderness.   Musculoskeletal:      Right lower leg: No edema.      Left lower leg: No edema.   Skin:     General: Skin is warm and dry.      Comments: R buttock with a small area of induration, mild erythema, packing in place in wound    Neurological:      Mental Status: He is alert and oriented to person, place, and time. Mental status is at baseline.             Significant Labs: All pertinent labs within the past 24 hours have been reviewed.    Significant Imaging: I have reviewed all pertinent imaging results/findings within the past 24 hours.    Assessment/Plan:      Pneumonia  - Continue empiric Levofloxacin   - obtain sputum cx   - add Guaifenesin for cough   - monitor pulse ox, currently on RA       Cellulitis and abscess of buttock  S/p I&D x 2 in the ED   Continue IV Vancomycin; Vanc dosing and monitoring per pharmacy   Consult wound care  Monitor exam, if leukocytosis persists or erythema worsens, will consult Gen Surg to eval       Sepsis  This patient does have evidence of infective focus  My overall impression is sepsis.  Source: Respiratory and Skin and Soft Tissue (location buttock abscess), Flu/COVID negative.   Antibiotics  given-   Antibiotics (72h ago, onward)      Start     Stop Route Frequency Ordered    01/16/24 2200  levoFLOXacin 750 mg/150 mL IVPB 750 mg         -- IV Every 24 hours (non-standard times) 01/16/24 0626    01/16/24 1630  vancomycin 2 g in dextrose 5 % 500 mL IVPB         -- IV Every 24 hours (non-standard times) 01/15/24 1858    01/15/24 1449  vancomycin - pharmacy to dose  (vancomycin IVPB (PEDS and ADULTS))        See Hyperspace for full Linked Orders Report.    -- IV pharmacy to manage frequency 01/15/24 1351          Latest lactate reviewed-  Recent Labs   Lab 01/15/24  1313   LACTATE 1.1       Organ dysfunction indicated by None     Fluid challenge Not needed - patient is not hypotensive      Post- resuscitation assessment No - Post resuscitation assessment not needed     Will Not start Pressors- Levophed for MAP of 65  Source control achieved by:  IV abx, s/p I&D in the ED       Dyspnea  Likely in setting of PNA   Continue abx as above   IS as able, Add Sarah         VTE Risk Mitigation (From admission, onward)           Ordered     enoxaparin injection 40 mg  Daily         01/15/24 2055     IP VTE LOW RISK PATIENT  Once         01/15/24 2055     Place sequential compression device  Until discontinued         01/15/24 2055                    Discharge Planning   MATT:      Code Status: Full Code   Is the patient medically ready for discharge?:     Reason for patient still in hospital (select all that apply): Patient trending condition and Treatment                     Magalys Mcgraw MD  Department of Hospital Medicine   [facility] - [facility]

## 2024-01-16 NOTE — ASSESSMENT & PLAN NOTE
This patient does have evidence of infective focus  My overall impression is sepsis.  Source: Respiratory and Skin and Soft Tissue (location buttock abscess), Flu/COVID negative.   Antibiotics given-   Antibiotics (72h ago, onward)      Start     Stop Route Frequency Ordered    01/16/24 2200  levoFLOXacin 750 mg/150 mL IVPB 750 mg         -- IV Every 24 hours (non-standard times) 01/16/24 0626    01/16/24 1630  vancomycin 2 g in dextrose 5 % 500 mL IVPB         -- IV Every 24 hours (non-standard times) 01/15/24 1858    01/15/24 1449  vancomycin - pharmacy to dose  (vancomycin IVPB (PEDS and ADULTS))        See Hyperspace for full Linked Orders Report.    -- IV pharmacy to manage frequency 01/15/24 1351          Latest lactate reviewed-  Recent Labs   Lab 01/15/24  1313   LACTATE 1.1       Organ dysfunction indicated by None     Fluid challenge Not needed - patient is not hypotensive      Post- resuscitation assessment No - Post resuscitation assessment not needed     Will Not start Pressors- Levophed for MAP of 65  Source control achieved by:  IV abx, s/p I&D in the ED

## 2024-01-16 NOTE — ASSESSMENT & PLAN NOTE
- Continue empiric Levofloxacin   - obtain sputum cx   - add Guaifenesin for cough   - monitor pulse ox, currently on RA

## 2024-01-16 NOTE — CONSULTS
"[facility] Surg  Wound Care    Patient Name:  Dallas Becker   MRN:  [id number]  Date: 1/16/2024  Diagnosis: Fever    History:     Past Medical History:   Diagnosis Date    AAA (abdominal aortic aneurysm)     W/ REPAIR    Fever 01/16/2024       Social History     Socioeconomic History    Marital status: Single       Precautions:     Allergies as of 01/15/2024    (No Known Allergies)       WO Assessment Details/Treatment     Consulted on this 67 y/o M patient due to present on admission I&D site to right medial buttocks. Patient admitted with fever, I&D to right medial buttocks had been performed in ER the day prior to admit.   Patient turned for assessment.  I&D site to right medial buttock noted, measures 1x0.3x3cm, moist red wound bed, small amount serosanguinous and purulent drainage on packing when removed. Cleansed with saline and re-packed with 1/4" gauze packing strip, then topped with dry gauze and secured with medipore tape. Discussed caer with patient. Recommend daily packing while hospitalized, on discharge, allow packing to fall out, cleanse daily with soap and water and apply clean pad into underwear.         01/16/24 1015   WOCN Assessment   WOCN Total Time (mins) 30   Visit Date 01/16/24   Visit Time 1015   Consult Type New   WOCN Speciality Wound   Wound I&D   Number of Wounds 1   Intervention assessed;applied;chart review;coordination of care;orders;team conference   Teaching on-going;complication;discharge        Altered Skin Integrity 01/16/24 Right medial Buttocks Abscess   Date First Assessed: 01/16/24   Altered Skin Integrity Present on Admission - Did Patient arrive to the hospital with altered skin?: yes  Side: Right  Orientation: medial  Location: Buttocks  Primary Wound Type: (c) Abscess   Dressing Appearance Intact   Drainage Amount Small   Drainage Characteristics/Odor Serosanguineous   Appearance Red;Moist   Tissue loss description Full thickness   Periwound Area Intact   Wound " Edges Open   Wound Length (cm) 1 cm   Wound Width (cm) 0.3 cm   Wound Depth (cm) 3 cm   Wound Volume (cm^3) 0.9 cm^3   Wound Surface Area (cm^2) 0.3 cm^2   Care Cleansed with:;Sterile normal saline;Applied:;Skin Barrier   Dressing Gauze   Packing packing removed;packed with;strip gauze   Packing Inserted  1   Packing Removed 1   Dressing Change Due 01/17/24       Recommendations made to primary team for wound care daily . Orders placed.     01/16/2024

## 2024-01-16 NOTE — PLAN OF CARE
[facility]  Initial Discharge Assessment       Primary Care Provider: Lalita, Primary Doctor    Admission Diagnosis: Abscess of buttock [L02.31]  Fever [R50.9]  Pneumonia of left upper lobe due to infectious organism [J18.9]    Admission Date: 1/15/2024  Expected Discharge Date: Per Attending     Transition of Care Barriers: None    Payor: MEDICARE / Plan: MEDICARE PART A & B / Product Type: Government /     Extended Emergency Contact Information  Primary Emergency Contact: OlgaRohan  Mobile Phone: [phone]  Relation: Relative   needed? No    Discharge Plan A: Home with family         [id number] - [place] - [address]  [address]  [place] [address]  Phone: [phone] Fax: [phone]      Initial Assessment (most recent)       Adult Discharge Assessment - 01/16/24 1208          Discharge Assessment    Assessment Type Discharge Planning Assessment     Confirmed/corrected address, phone number and insurance Yes     Confirmed Demographics Correct on Facesheet     Source of Information patient     Communicated MATT with patient/caregiver Date not available/Unable to determine     People in Home other relative(s)     Do you expect to return to your current living situation? Yes     Do you have help at home or someone to help you manage your care at home? Yes     Who are your caregiver(s) and their phone number(s)? [email or web address]     Prior to hospitilization cognitive status: Alert/Oriented     Current cognitive status: Alert/Oriented     Walking or Climbing Stairs Difficulty no     Dressing/Bathing Difficulty no     Home Layout Able to live on 1st floor     Equipment Currently Used at Home none     Readmission within 30 days? No     Patient currently being followed by outpatient case management? No     Do you currently have service(s) that help you manage your care at home? No     Do you take prescription medications? Yes     Do you have prescription coverage? Yes      Coverage Medicare     Do you have any problems affording any of your prescribed medications? No     Is the patient taking medications as prescribed? yes     Who is going to help you get home at discharge? cousinRohan     How do you get to doctors appointments? car, drives self;family or friend will provide     Are you on dialysis? No     Do you take coumadin? No     Discharge Plan A Home with family     DME Needed Upon Discharge  none     Discharge Plan discussed with: Patient     Transition of Care Barriers None                   Sw met with patient to complete assessment and to discuss d/c planning needs. Patient has no d/c needs at this time. Sw to follow up, as needed, for d/c planning purposes.

## 2024-01-16 NOTE — ASSESSMENT & PLAN NOTE
Patient s/p I&D with gauze packing in ED day prior to admission with continued erythema, induration, purulent discharge, and TTP throughout.  Patient afebrile with leukocytosis in his currently on vancomycin and levofloxacin.  Plan:  -continue antibiotics  -continue wound care  -general surgery consult pending clinical response

## 2024-01-16 NOTE — H&P
[facility] Medicine  History & Physical    Patient Name: Dallas Becker  MRN: [id number]  Patient Class: OP- Observation  Admission Date: 1/15/2024  Attending Physician: Clay Michelle MD   Primary Care Provider: Lalita Primary Doctor         Patient information was obtained from patient, past medical records, and ER records.     Subjective:     Principal Problem:Fever    Chief Complaint:   Chief Complaint   Patient presents with    Fever     Seen here yesterday for abscess; states he does not have fever reducing medication to take.        HPI: Dallas Becker is a 68 y.o. male with a PMH  has a past medical history of AAA (abdominal aortic aneurysm). who presented as a transfer from [place] for higher level of care for treatment of fever and pneumonia. Patient initially seen in ED day prior and underwent I&D of buttocks abscess but presented back to the ED earlier today after developing acute onset fever with T-max measuring 101.0 at home. Patient reported no known alleviating or aggravating factors noted with associated symptoms including dyspnea, yellow/green productive cough, and chest pain with inspiration which progressively worsened over the past few days.  Patient reported having no significant past medical history and denied exposure to ill contacts, recent hospitalizations, recent travel, and denies use of home O2, nebulizers, CPAP, or underlying lung disease.  Initial workup in the ED revealed patient met SIRS criteria in setting of underlying pneumonia and recent buttocks abscess and was admitted to Hospital Medicine under observation for continued medical management.    PCP: Lalita, Primary Doctor      Past Medical History:   Diagnosis Date    AAA (abdominal aortic aneurysm)     W/ REPAIR       No past surgical history on file.    Review of patient's allergies indicates:  No Known Allergies    No current facility-administered medications on file prior to encounter.     No  current outpatient medications on file prior to encounter.     Family History    None       Tobacco Use    Smoking status: Not on file    Smokeless tobacco: Not on file   Substance and Sexual Activity    Alcohol use: Not on file    Drug use: Not on file    Sexual activity: Not on file     Review of Systems   All other systems reviewed and are negative.    Objective:     Vital Signs (Most Recent):  Temp: 97.6 °F (36.4 °C) (01/16/24 0354)  Pulse: 89 (01/16/24 0354)  Resp: 18 (01/16/24 0354)  BP: 126/67 (01/16/24 0354)  SpO2: (!) 89 % (01/16/24 0354) Vital Signs (24h Range):  Temp:  [97.6 °F (36.4 °C)-100.5 °F (38.1 °C)] 97.6 °F (36.4 °C)  Pulse:  [] 89  Resp:  [18-24] 18  SpO2:  [87 %-95 %] 89 %  BP: ()/(60-69) 126/67     Weight: 79.5 kg (175 lb 4.3 oz)  Body mass index is 23.12 kg/m².     Physical Exam  Vitals reviewed.   Constitutional:       General: He is not in acute distress.     Appearance: Normal appearance. He is normal weight. He is not ill-appearing, toxic-appearing or diaphoretic.   HENT:      Head: Normocephalic and atraumatic.      Right Ear: External ear normal.      Left Ear: External ear normal.      Nose: Nose normal. No congestion or rhinorrhea.      Mouth/Throat:      Mouth: Mucous membranes are moist.      Pharynx: Oropharynx is clear. No oropharyngeal exudate or posterior oropharyngeal erythema.   Eyes:      General: No scleral icterus.     Extraocular Movements: Extraocular movements intact.      Conjunctiva/sclera: Conjunctivae normal.      Pupils: Pupils are equal, round, and reactive to light.   Neck:      Vascular: No carotid bruit.   Cardiovascular:      Rate and Rhythm: Normal rate and regular rhythm.      Pulses: Normal pulses.      Heart sounds: Normal heart sounds. No murmur heard.     No friction rub. No gallop.   Pulmonary:      Effort: Pulmonary effort is normal. No respiratory distress.      Breath sounds: Normal breath sounds. No stridor. No wheezing, rhonchi or rales.    Chest:      Chest wall: No tenderness.   Abdominal:      General: Abdomen is flat. Bowel sounds are normal. There is no distension.      Palpations: Abdomen is soft. There is no mass.      Tenderness: There is no abdominal tenderness. There is no guarding or rebound.      Hernia: No hernia is present.   Genitourinary:     Comments: Positive erythema with associated induration in TTP throughout right inner buttocks with dressing in place following recent I&D.  Musculoskeletal:         General: No swelling, tenderness, deformity or signs of injury. Normal range of motion.      Cervical back: Normal range of motion and neck supple. No rigidity or tenderness.   Lymphadenopathy:      Cervical: No cervical adenopathy.   Skin:     General: Skin is warm and dry.      Capillary Refill: Capillary refill takes less than 2 seconds.      Coloration: Skin is not jaundiced or pale.      Findings: No bruising, erythema, lesion or rash.   Neurological:      General: No focal deficit present.      Mental Status: He is alert and oriented to person, place, and time. Mental status is at baseline.      Cranial Nerves: No cranial nerve deficit.      Sensory: No sensory deficit.      Motor: No weakness.      Coordination: Coordination normal.   Psychiatric:         Mood and Affect: Mood normal.         Behavior: Behavior normal.         Thought Content: Thought content normal.         Judgment: Judgment normal.              CRANIAL NERVES     CN III, IV, VI   Pupils are equal, round, and reactive to light.       Significant Labs: All pertinent labs within the past 24 hours have been reviewed.    Significant Imaging: I have reviewed all pertinent imaging results/findings within the past 24 hours.    LABS:  Recent Results (from the past 24 hour(s))   CBC auto differential    Collection Time: 01/15/24  1:13 PM   Result Value Ref Range    WBC 23.41 (H) 3.90 - 12.70 K/uL    RBC 4.10 (L) 4.60 - 6.20 M/uL    Hemoglobin 12.3 (L) 14.0 - 18.0 g/dL     Hematocrit 36.7 (L) 40.0 - 54.0 %    MCV 90 82 - 98 fL    MCH 30.0 27.0 - 31.0 pg    MCHC 33.5 32.0 - 36.0 g/dL    RDW 13.4 11.5 - 14.5 %    Platelets 374 150 - 450 K/uL    MPV 10.6 9.2 - 12.9 fL    Immature Granulocytes 0.7 (H) 0.0 - 0.5 %    Gran # (ANC) 21.1 (H) 1.8 - 7.7 K/uL    Immature Grans (Abs) 0.17 (H) 0.00 - 0.04 K/uL    Lymph # 0.9 (L) 1.0 - 4.8 K/uL    Mono # 1.2 (H) 0.3 - 1.0 K/uL    Eos # 0.0 0.0 - 0.5 K/uL    Baso # 0.07 0.00 - 0.20 K/uL    nRBC 0 0 /100 WBC    Gran % 89.9 (H) 38.0 - 73.0 %    Lymph % 3.8 (L) 18.0 - 48.0 %    Mono % 5.1 4.0 - 15.0 %    Eosinophil % 0.2 0.0 - 8.0 %    Basophil % 0.3 0.0 - 1.9 %    Differential Method Automated    Comprehensive metabolic panel    Collection Time: 01/15/24  1:13 PM   Result Value Ref Range    Sodium 132 (L) 136 - 145 mmol/L    Potassium 3.9 3.5 - 5.1 mmol/L    Chloride 99 95 - 110 mmol/L    CO2 21 (L) 23 - 29 mmol/L    Glucose 140 (H) 70 - 110 mg/dL    BUN 12 8 - 23 mg/dL    Creatinine 0.9 0.5 - 1.4 mg/dL    Calcium 9.1 8.7 - 10.5 mg/dL    Total Protein 7.3 6.0 - 8.4 g/dL    Albumin 2.9 (L) 3.5 - 5.2 g/dL    Total Bilirubin 1.1 (H) 0.1 - 1.0 mg/dL    Alkaline Phosphatase 88 55 - 135 U/L    AST 14 10 - 40 U/L    ALT 13 10 - 44 U/L    eGFR >60.0 >60 mL/min/1.73 m^2    Anion Gap 12 8 - 16 mmol/L   Lactic acid, plasma #1    Collection Time: 01/15/24  1:13 PM   Result Value Ref Range    Lactate (Lactic Acid) 1.1 0.5 - 2.2 mmol/L   Procalcitonin    Collection Time: 01/15/24  1:13 PM   Result Value Ref Range    Procalcitonin 0.32 (H) <0.25 ng/mL   POCT COVID-19 Rapid Screening    Collection Time: 01/15/24  2:20 PM   Result Value Ref Range    POC Rapid COVID Negative Negative     Acceptable Yes    POCT Influenza A/B Molecular    Collection Time: 01/15/24  2:21 PM   Result Value Ref Range    POC Molecular Influenza A Ag Negative Negative, Not Reported    POC Molecular Influenza B Ag Negative Negative, Not Reported     Acceptable  Yes        RADIOLOGY  X-Ray Chest PA And Lateral    Result Date: 1/15/2024  EXAMINATION: XR CHEST PA AND LATERAL CLINICAL HISTORY: Fever, unspecified COMPARISON: None FINDINGS: The size of the heart is normal.  There has been interval development of a moderate amount of alveolar consolidation in the lingula.  There has been interval development of a small left pleural effusion.  There is no focal pulmonary infiltrate visualized in the right lung.  There is no pneumothorax.     1. There has been interval development of a lingular pneumonia. 2. There has been interval development of a small left pleural effusion. . Electronically signed by: Fracisco Flor MD Date:    01/15/2024 Time:    14:20      EKG    MICROBIOLOGY    [facility]    Assessment/Plan:     * Fever    Dyspnea    Pneumonia    Sepsis  This patient does have evidence of infective focus  My overall impression is sepsis.  Source: Respiratory and Skin and Soft Tissue (location buttock abscess), Flu/COVID negative.   Antibiotics given-   Antibiotics (72h ago, onward)      Start     Stop Route Frequency Ordered    01/16/24 2200  levoFLOXacin 750 mg/150 mL IVPB 750 mg         -- IV Every 24 hours (non-standard times) 01/16/24 0626    01/16/24 1630  vancomycin 2 g in dextrose 5 % 500 mL IVPB         -- IV Every 24 hours (non-standard times) 01/15/24 1858    01/15/24 1449  vancomycin - pharmacy to dose  (vancomycin IVPB (PEDS and ADULTS))        See Hyperspace for full Linked Orders Report.    -- IV pharmacy to manage frequency 01/15/24 1351          Latest lactate reviewed-  Recent Labs   Lab 01/15/24  1313   LACTATE 1.1     Organ dysfunction indicated by Acute respiratory failure    Fluid challenge Not needed - patient is not hypotensive      Post- resuscitation assessment No - Post resuscitation assessment not needed     Will Not start Pressors- Levophed for MAP of 65  Source control achieved by:  Vancomycin and Levofloxacin      Cellulitis and abscess of  buttock  Patient s/p I&D with gauze packing in ED day prior to admission with continued erythema, induration, purulent discharge, and TTP throughout.  Patient afebrile with leukocytosis in his currently on vancomycin and levofloxacin.  Plan:  -continue antibiotics  -continue wound care  -general surgery consult pending clinical response        VTE Risk Mitigation (From admission, onward)           Ordered     enoxaparin injection 40 mg  Daily         01/15/24 2055     IP VTE LOW RISK PATIENT  Once         01/15/24 2055     Place sequential compression device  Until discontinued         01/15/24 2055                  //Core Measures   -DVT proph: SCDs, Lovenox  -Code status: Full    -Surrogate: none provided       Components of this note were documented using a voice recognition system and are subject to errors not corrected at the time the document was proof read. Please contact the author for any clarifications.        On 01/15/2024, patient should be placed in hospital observation services under my care.       Clay Michelle MD  Department of Hospital Medicine  [facility] - [facility]

## 2024-01-16 NOTE — HPI
Dallas Becker is a 68 y.o. male with a PMH  has a past medical history of AAA (abdominal aortic aneurysm). who presented as a transfer from [facility] for higher level of care for treatment of fever and pneumonia. Patient initially seen in ED day prior and underwent I&D of buttocks abscess but presented back to the ED earlier today after developing acute onset fever with T-max measuring 101.0 at home. Patient reported no known alleviating or aggravating factors noted with associated symptoms including dyspnea, yellow/green productive cough, and chest pain with inspiration which progressively worsened over the past few days.  Patient reported having no significant past medical history and denied exposure to ill contacts, recent hospitalizations, recent travel, and denies use of home O2, nebulizers, CPAP, or underlying lung disease.  Initial workup in the ED revealed patient met SIRS criteria in setting of underlying pneumonia and recent buttocks abscess and was admitted to Hospital Medicine under observation for continued medical management.    PCP: No, Primary Doctor

## 2024-01-16 NOTE — SUBJECTIVE & OBJECTIVE
Past Medical History:   Diagnosis Date    AAA (abdominal aortic aneurysm)     W/ REPAIR       No past surgical history on file.    Review of patient's allergies indicates:  No Known Allergies    No current facility-administered medications on file prior to encounter.     No current outpatient medications on file prior to encounter.     Family History    None       Tobacco Use    Smoking status: Not on file    Smokeless tobacco: Not on file   Substance and Sexual Activity    Alcohol use: Not on file    Drug use: Not on file    Sexual activity: Not on file     Review of Systems   All other systems reviewed and are negative.    Objective:     Vital Signs (Most Recent):  Temp: 97.6 °F (36.4 °C) (01/16/24 0354)  Pulse: 89 (01/16/24 0354)  Resp: 18 (01/16/24 0354)  BP: 126/67 (01/16/24 0354)  SpO2: (!) 89 % (01/16/24 0354) Vital Signs (24h Range):  Temp:  [97.6 °F (36.4 °C)-100.5 °F (38.1 °C)] 97.6 °F (36.4 °C)  Pulse:  [] 89  Resp:  [18-24] 18  SpO2:  [87 %-95 %] 89 %  BP: ()/(60-69) 126/67     Weight: 79.5 kg (175 lb 4.3 oz)  Body mass index is 23.12 kg/m².     Physical Exam  Vitals reviewed.   Constitutional:       General: He is not in acute distress.     Appearance: Normal appearance. He is normal weight. He is not ill-appearing, toxic-appearing or diaphoretic.   HENT:      Head: Normocephalic and atraumatic.      Right Ear: External ear normal.      Left Ear: External ear normal.      Nose: Nose normal. No congestion or rhinorrhea.      Mouth/Throat:      Mouth: Mucous membranes are moist.      Pharynx: Oropharynx is clear. No oropharyngeal exudate or posterior oropharyngeal erythema.   Eyes:      General: No scleral icterus.     Extraocular Movements: Extraocular movements intact.      Conjunctiva/sclera: Conjunctivae normal.      Pupils: Pupils are equal, round, and reactive to light.   Neck:      Vascular: No carotid bruit.   Cardiovascular:      Rate and Rhythm: Normal rate and regular rhythm.       Pulses: Normal pulses.      Heart sounds: Normal heart sounds. No murmur heard.     No friction rub. No gallop.   Pulmonary:      Effort: Pulmonary effort is normal. No respiratory distress.      Breath sounds: Normal breath sounds. No stridor. No wheezing, rhonchi or rales.   Chest:      Chest wall: No tenderness.   Abdominal:      General: Abdomen is flat. Bowel sounds are normal. There is no distension.      Palpations: Abdomen is soft. There is no mass.      Tenderness: There is no abdominal tenderness. There is no guarding or rebound.      Hernia: No hernia is present.   Genitourinary:     Comments: Positive erythema with associated induration in TTP throughout right inner buttocks with dressing in place following recent I&D.  Musculoskeletal:         General: No swelling, tenderness, deformity or signs of injury. Normal range of motion.      Cervical back: Normal range of motion and neck supple. No rigidity or tenderness.   Lymphadenopathy:      Cervical: No cervical adenopathy.   Skin:     General: Skin is warm and dry.      Capillary Refill: Capillary refill takes less than 2 seconds.      Coloration: Skin is not jaundiced or pale.      Findings: No bruising, erythema, lesion or rash.   Neurological:      General: No focal deficit present.      Mental Status: He is alert and oriented to person, place, and time. Mental status is at baseline.      Cranial Nerves: No cranial nerve deficit.      Sensory: No sensory deficit.      Motor: No weakness.      Coordination: Coordination normal.   Psychiatric:         Mood and Affect: Mood normal.         Behavior: Behavior normal.         Thought Content: Thought content normal.         Judgment: Judgment normal.              CRANIAL NERVES     CN III, IV, VI   Pupils are equal, round, and reactive to light.       Significant Labs: All pertinent labs within the past 24 hours have been reviewed.    Significant Imaging: I have reviewed all pertinent imaging  results/findings within the past 24 hours.    LABS:  Recent Results (from the past 24 hour(s))   CBC auto differential    Collection Time: 01/15/24  1:13 PM   Result Value Ref Range    WBC 23.41 (H) 3.90 - 12.70 K/uL    RBC 4.10 (L) 4.60 - 6.20 M/uL    Hemoglobin 12.3 (L) 14.0 - 18.0 g/dL    Hematocrit 36.7 (L) 40.0 - 54.0 %    MCV 90 82 - 98 fL    MCH 30.0 27.0 - 31.0 pg    MCHC 33.5 32.0 - 36.0 g/dL    RDW 13.4 11.5 - 14.5 %    Platelets 374 150 - 450 K/uL    MPV 10.6 9.2 - 12.9 fL    Immature Granulocytes 0.7 (H) 0.0 - 0.5 %    Gran # (ANC) 21.1 (H) 1.8 - 7.7 K/uL    Immature Grans (Abs) 0.17 (H) 0.00 - 0.04 K/uL    Lymph # 0.9 (L) 1.0 - 4.8 K/uL    Mono # 1.2 (H) 0.3 - 1.0 K/uL    Eos # 0.0 0.0 - 0.5 K/uL    Baso # 0.07 0.00 - 0.20 K/uL    nRBC 0 0 /100 WBC    Gran % 89.9 (H) 38.0 - 73.0 %    Lymph % 3.8 (L) 18.0 - 48.0 %    Mono % 5.1 4.0 - 15.0 %    Eosinophil % 0.2 0.0 - 8.0 %    Basophil % 0.3 0.0 - 1.9 %    Differential Method Automated    Comprehensive metabolic panel    Collection Time: 01/15/24  1:13 PM   Result Value Ref Range    Sodium 132 (L) 136 - 145 mmol/L    Potassium 3.9 3.5 - 5.1 mmol/L    Chloride 99 95 - 110 mmol/L    CO2 21 (L) 23 - 29 mmol/L    Glucose 140 (H) 70 - 110 mg/dL    BUN 12 8 - 23 mg/dL    Creatinine 0.9 0.5 - 1.4 mg/dL    Calcium 9.1 8.7 - 10.5 mg/dL    Total Protein 7.3 6.0 - 8.4 g/dL    Albumin 2.9 (L) 3.5 - 5.2 g/dL    Total Bilirubin 1.1 (H) 0.1 - 1.0 mg/dL    Alkaline Phosphatase 88 55 - 135 U/L    AST 14 10 - 40 U/L    ALT 13 10 - 44 U/L    eGFR >60.0 >60 mL/min/1.73 m^2    Anion Gap 12 8 - 16 mmol/L   Lactic acid, plasma #1    Collection Time: 01/15/24  1:13 PM   Result Value Ref Range    Lactate (Lactic Acid) 1.1 0.5 - 2.2 mmol/L   Procalcitonin    Collection Time: 01/15/24  1:13 PM   Result Value Ref Range    Procalcitonin 0.32 (H) <0.25 ng/mL   POCT COVID-19 Rapid Screening    Collection Time: 01/15/24  2:20 PM   Result Value Ref Range    POC Rapid COVID Negative  Negative     Acceptable Yes    POCT Influenza A/B Molecular    Collection Time: 01/15/24  2:21 PM   Result Value Ref Range    POC Molecular Influenza A Ag Negative Negative, Not Reported    POC Molecular Influenza B Ag Negative Negative, Not Reported     Acceptable Yes        RADIOLOGY  X-Ray Chest PA And Lateral    Result Date: 1/15/2024  EXAMINATION: XR CHEST PA AND LATERAL CLINICAL HISTORY: Fever, unspecified COMPARISON: None FINDINGS: The size of the heart is normal.  There has been interval development of a moderate amount of alveolar consolidation in the lingula.  There has been interval development of a small left pleural effusion.  There is no focal pulmonary infiltrate visualized in the right lung.  There is no pneumothorax.     1. There has been interval development of a lingular pneumonia. 2. There has been interval development of a small left pleural effusion. . Electronically signed by: Fracisco Flor MD Date:    01/15/2024 Time:    14:20      EKG    MICROBIOLOGY    MDM

## 2024-01-16 NOTE — ASSESSMENT & PLAN NOTE
S/p I&D x 2 in the ED   Continue IV Vancomycin; Vanc dosing and monitoring per pharmacy   Consult wound care  Monitor exam, if leukocytosis persists or erythema worsens, will consult Gen Surg to eval

## 2024-01-16 NOTE — ASSESSMENT & PLAN NOTE
This patient does have evidence of infective focus  My overall impression is sepsis.  Source: Respiratory and Skin and Soft Tissue (location buttock abscess), Flu/COVID negative.   Antibiotics given-   Antibiotics (72h ago, onward)      Start     Stop Route Frequency Ordered    01/16/24 2200  levoFLOXacin 750 mg/150 mL IVPB 750 mg         -- IV Every 24 hours (non-standard times) 01/16/24 0626    01/16/24 1630  vancomycin 2 g in dextrose 5 % 500 mL IVPB         -- IV Every 24 hours (non-standard times) 01/15/24 1858    01/15/24 1449  vancomycin - pharmacy to dose  (vancomycin IVPB (PEDS and ADULTS))        See Hyperspace for full Linked Orders Report.    -- IV pharmacy to manage frequency 01/15/24 1351          Latest lactate reviewed-  Recent Labs   Lab 01/15/24  1313   LACTATE 1.1     Organ dysfunction indicated by Acute respiratory failure    Fluid challenge Not needed - patient is not hypotensive      Post- resuscitation assessment No - Post resuscitation assessment not needed     Will Not start Pressors- Levophed for MAP of 65  Source control achieved by:  Vancomycin and Levofloxacin

## 2024-01-16 NOTE — SUBJECTIVE & OBJECTIVE
Interval History: Pt admitted overnight for management of PNA and buttock abscess. Pt reports cough productive of green/yellow sputum and pain over R buttock. Denies CP, SOB.     Review of Systems  Objective:     Vital Signs (Most Recent):  Temp: 98.8 °F (37.1 °C) (01/16/24 0818)  Pulse: 88 (01/16/24 0818)  Resp: 16 (01/16/24 0818)  BP: (!) 116/59 (01/16/24 0818)  SpO2: (!) 90 % (01/16/24 0818) Vital Signs (24h Range):  Temp:  [97.6 °F (36.4 °C)-100.5 °F (38.1 °C)] 98.8 °F (37.1 °C)  Pulse:  [] 88  Resp:  [16-24] 16  SpO2:  [87 %-95 %] 90 %  BP: ()/(59-69) 116/59     Weight: 79.5 kg (175 lb 4.3 oz)  Body mass index is 23.12 kg/m².  No intake or output data in the 24 hours ending 01/16/24 1117      Physical Exam  Vitals and nursing note reviewed.   Constitutional:       General: He is not in acute distress.     Appearance: Ill appearance: chronically ill appearing.   Cardiovascular:      Rate and Rhythm: Normal rate and regular rhythm.   Pulmonary:      Breath sounds: No wheezing, rhonchi or rales.      Comments: On room air   Abdominal:      General: Bowel sounds are normal. There is no distension.      Palpations: Abdomen is soft.      Tenderness: There is no abdominal tenderness.   Musculoskeletal:      Right lower leg: No edema.      Left lower leg: No edema.   Skin:     General: Skin is warm and dry.      Comments: R buttock with a small area of induration, mild erythema, packing in place in wound    Neurological:      Mental Status: He is alert and oriented to person, place, and time. Mental status is at baseline.             Significant Labs: All pertinent labs within the past 24 hours have been reviewed.    Significant Imaging: I have reviewed all pertinent imaging results/findings within the past 24 hours.

## 2024-01-17 LAB
ALBUMIN SERPL BCP-MCNC: 2.3 G/DL (ref 3.5–5.2)
ALP SERPL-CCNC: 98 U/L (ref 55–135)
ALT SERPL W/O P-5'-P-CCNC: 10 U/L (ref 10–44)
ANION GAP SERPL CALC-SCNC: 10 MMOL/L (ref 8–16)
ANISOCYTOSIS BLD QL SMEAR: SLIGHT
AST SERPL-CCNC: 13 U/L (ref 10–40)
BASOPHILS # BLD AUTO: 0.07 K/UL (ref 0–0.2)
BASOPHILS NFR BLD: 0.3 % (ref 0–1.9)
BILIRUB SERPL-MCNC: 0.8 MG/DL (ref 0.1–1)
BUN SERPL-MCNC: 11 MG/DL (ref 8–23)
CALCIUM SERPL-MCNC: 8.8 MG/DL (ref 8.7–10.5)
CHLORIDE SERPL-SCNC: 101 MMOL/L (ref 95–110)
CO2 SERPL-SCNC: 23 MMOL/L (ref 23–29)
CREAT SERPL-MCNC: 0.8 MG/DL (ref 0.5–1.4)
DACRYOCYTES BLD QL SMEAR: ABNORMAL
DIFFERENTIAL METHOD BLD: ABNORMAL
EOSINOPHIL # BLD AUTO: 0 K/UL (ref 0–0.5)
EOSINOPHIL NFR BLD: 0.2 % (ref 0–8)
ERYTHROCYTE [DISTWIDTH] IN BLOOD BY AUTOMATED COUNT: 13.3 % (ref 11.5–14.5)
EST. GFR  (NO RACE VARIABLE): >60 ML/MIN/1.73 M^2
GLUCOSE SERPL-MCNC: 120 MG/DL (ref 70–110)
HCT VFR BLD AUTO: 34.9 % (ref 40–54)
HGB BLD-MCNC: 11.7 G/DL (ref 14–18)
IMM GRANULOCYTES # BLD AUTO: 0.16 K/UL (ref 0–0.04)
IMM GRANULOCYTES NFR BLD AUTO: 0.7 % (ref 0–0.5)
LYMPHOCYTES # BLD AUTO: 1 K/UL (ref 1–4.8)
LYMPHOCYTES NFR BLD: 4 % (ref 18–48)
MCH RBC QN AUTO: 29.3 PG (ref 27–31)
MCHC RBC AUTO-ENTMCNC: 33.5 G/DL (ref 32–36)
MCV RBC AUTO: 87 FL (ref 82–98)
MONOCYTES # BLD AUTO: 1.5 K/UL (ref 0.3–1)
MONOCYTES NFR BLD: 6 % (ref 4–15)
NEUTROPHILS # BLD AUTO: 21.5 K/UL (ref 1.8–7.7)
NEUTROPHILS NFR BLD: 88.8 % (ref 38–73)
NRBC BLD-RTO: 0 /100 WBC
PLATELET # BLD AUTO: 394 K/UL (ref 150–450)
PLATELET BLD QL SMEAR: ABNORMAL
PMV BLD AUTO: 11.5 FL (ref 9.2–12.9)
POTASSIUM SERPL-SCNC: 3.9 MMOL/L (ref 3.5–5.1)
PROT SERPL-MCNC: 7 G/DL (ref 6–8.4)
RBC # BLD AUTO: 4 M/UL (ref 4.6–6.2)
SODIUM SERPL-SCNC: 134 MMOL/L (ref 136–145)
VANCOMYCIN TROUGH SERPL-MCNC: 5.6 UG/ML (ref 10–22)
WBC # BLD AUTO: 24.22 K/UL (ref 3.9–12.7)

## 2024-01-17 PROCEDURE — 36415 COLL VENOUS BLD VENIPUNCTURE: CPT | Performed by: HOSPITALIST

## 2024-01-17 PROCEDURE — 99900035 HC TECH TIME PER 15 MIN (STAT)

## 2024-01-17 PROCEDURE — 25000003 PHARM REV CODE 250: Performed by: INTERNAL MEDICINE

## 2024-01-17 PROCEDURE — 97530 THERAPEUTIC ACTIVITIES: CPT

## 2024-01-17 PROCEDURE — 80202 ASSAY OF VANCOMYCIN: CPT | Performed by: INTERNAL MEDICINE

## 2024-01-17 PROCEDURE — 25000242 PHARM REV CODE 250 ALT 637 W/ HCPCS: Performed by: INTERNAL MEDICINE

## 2024-01-17 PROCEDURE — 94640 AIRWAY INHALATION TREATMENT: CPT

## 2024-01-17 PROCEDURE — 97116 GAIT TRAINING THERAPY: CPT

## 2024-01-17 PROCEDURE — 85025 COMPLETE CBC W/AUTO DIFF WBC: CPT | Performed by: HOSPITALIST

## 2024-01-17 PROCEDURE — 97162 PT EVAL MOD COMPLEX 30 MIN: CPT

## 2024-01-17 PROCEDURE — 63600175 PHARM REV CODE 636 W HCPCS: Performed by: INTERNAL MEDICINE

## 2024-01-17 PROCEDURE — 27000221 HC OXYGEN, UP TO 24 HOURS

## 2024-01-17 PROCEDURE — 97166 OT EVAL MOD COMPLEX 45 MIN: CPT

## 2024-01-17 PROCEDURE — 94799 UNLISTED PULMONARY SVC/PX: CPT | Mod: XB

## 2024-01-17 PROCEDURE — 36415 COLL VENOUS BLD VENIPUNCTURE: CPT | Mod: XB | Performed by: INTERNAL MEDICINE

## 2024-01-17 PROCEDURE — 80053 COMPREHEN METABOLIC PANEL: CPT | Performed by: HOSPITALIST

## 2024-01-17 PROCEDURE — 11000001 HC ACUTE MED/SURG PRIVATE ROOM

## 2024-01-17 PROCEDURE — 63600175 PHARM REV CODE 636 W HCPCS: Performed by: HOSPITALIST

## 2024-01-17 PROCEDURE — 25000003 PHARM REV CODE 250: Performed by: HOSPITALIST

## 2024-01-17 PROCEDURE — 94761 N-INVAS EAR/PLS OXIMETRY MLT: CPT

## 2024-01-17 PROCEDURE — 25500020 PHARM REV CODE 255: Performed by: INTERNAL MEDICINE

## 2024-01-17 RX ORDER — GUAIFENESIN 600 MG/1
1200 TABLET, EXTENDED RELEASE ORAL 2 TIMES DAILY
Status: DISCONTINUED | OUTPATIENT
Start: 2024-01-17 | End: 2024-01-19 | Stop reason: HOSPADM

## 2024-01-17 RX ORDER — FLUTICASONE PROPIONATE 50 MCG
2 SPRAY, SUSPENSION (ML) NASAL DAILY
Status: DISCONTINUED | OUTPATIENT
Start: 2024-01-17 | End: 2024-01-19 | Stop reason: HOSPADM

## 2024-01-17 RX ORDER — SODIUM CHLORIDE 9 MG/ML
INJECTION, SOLUTION INTRAVENOUS
Status: DISCONTINUED | OUTPATIENT
Start: 2024-01-17 | End: 2024-01-19 | Stop reason: HOSPADM

## 2024-01-17 RX ORDER — IPRATROPIUM BROMIDE AND ALBUTEROL SULFATE 2.5; .5 MG/3ML; MG/3ML
3 SOLUTION RESPIRATORY (INHALATION) EVERY 6 HOURS
Status: DISCONTINUED | OUTPATIENT
Start: 2024-01-17 | End: 2024-01-19 | Stop reason: HOSPADM

## 2024-01-17 RX ORDER — SODIUM CHLORIDE FOR INHALATION 3 %
4 VIAL, NEBULIZER (ML) INHALATION EVERY 6 HOURS
Status: DISCONTINUED | OUTPATIENT
Start: 2024-01-17 | End: 2024-01-19

## 2024-01-17 RX ORDER — TALC
6 POWDER (GRAM) TOPICAL NIGHTLY
Status: DISCONTINUED | OUTPATIENT
Start: 2024-01-17 | End: 2024-01-19 | Stop reason: HOSPADM

## 2024-01-17 RX ADMIN — SODIUM CHLORIDE: 9 INJECTION, SOLUTION INTRAVENOUS at 10:01

## 2024-01-17 RX ADMIN — GUAIFENESIN 600 MG: 600 TABLET, EXTENDED RELEASE ORAL at 09:01

## 2024-01-17 RX ADMIN — IPRATROPIUM BROMIDE AND ALBUTEROL SULFATE 3 ML: .5; 3 SOLUTION RESPIRATORY (INHALATION) at 07:01

## 2024-01-17 RX ADMIN — SODIUM CHLORIDE 30 MG/ML INHALATION SOLUTION 4 ML: 30 SOLUTION INHALANT at 07:01

## 2024-01-17 RX ADMIN — FLUTICASONE PROPIONATE 100 MCG: 50 SPRAY, METERED NASAL at 09:01

## 2024-01-17 RX ADMIN — IOHEXOL 100 ML: 350 INJECTION, SOLUTION INTRAVENOUS at 09:01

## 2024-01-17 RX ADMIN — VANCOMYCIN HYDROCHLORIDE 1500 MG: 1.5 INJECTION, POWDER, LYOPHILIZED, FOR SOLUTION INTRAVENOUS at 10:01

## 2024-01-17 RX ADMIN — ALUMINUM HYDROXIDE, MAGNESIUM HYDROXIDE, AND DIMETHICONE 30 ML: 200; 20; 200 SUSPENSION ORAL at 05:01

## 2024-01-17 RX ADMIN — ENOXAPARIN SODIUM 40 MG: 40 INJECTION SUBCUTANEOUS at 05:01

## 2024-01-17 RX ADMIN — GUAIFENESIN 1200 MG: 600 TABLET, EXTENDED RELEASE ORAL at 09:01

## 2024-01-17 RX ADMIN — Medication 6 MG: at 09:01

## 2024-01-17 RX ADMIN — LEVOFLOXACIN 750 MG: 750 INJECTION, SOLUTION INTRAVENOUS at 10:01

## 2024-01-17 NOTE — SUBJECTIVE & OBJECTIVE
Interval History: Tmax 101 overnight. Pt reports cough is less productive today. Denies CP, SOB. Still has some soreness to R buttock after wound care. CT Pelvis ordered to eval abscess     Review of Systems  Objective:     Vital Signs (Most Recent):  Temp: 98.4 °F (36.9 °C) (01/17/24 0744)  Pulse: 94 (01/17/24 0844)  Resp: 16 (01/17/24 0844)  BP: (!) 134/57 (01/17/24 0744)  SpO2: (!) 92 % (01/17/24 0844) Vital Signs (24h Range):  Temp:  [98.3 °F (36.8 °C)-101.4 °F (38.6 °C)] 98.4 °F (36.9 °C)  Pulse:  [84-97] 94  Resp:  [16-20] 16  SpO2:  [90 %-94 %] 92 %  BP: (105-141)/(57-66) 134/57     Weight: 79.5 kg (175 lb 4.3 oz)  Body mass index is 23.12 kg/m².    Intake/Output Summary (Last 24 hours) at 1/17/2024 1020  Last data filed at 1/17/2024 0051  Gross per 24 hour   Intake 120 ml   Output --   Net 120 ml         Physical Exam  Vitals and nursing note reviewed.   Constitutional:       General: He is not in acute distress.     Appearance: He is obese. He is ill-appearing.   Cardiovascular:      Rate and Rhythm: Normal rate and regular rhythm.      Heart sounds: No murmur heard.     No friction rub. No gallop.   Pulmonary:      Comments: Diminished breath sounds b/l, no WRR, on 2L NC O2   Abdominal:      General: Bowel sounds are normal. There is no distension.      Palpations: Abdomen is soft.      Tenderness: There is no abdominal tenderness.   Musculoskeletal:      Right lower leg: No edema.      Left lower leg: No edema.   Skin:     General: Skin is warm and dry.      Comments: R buttock dressing and packing in place, no drainage noted    Neurological:      Mental Status: He is alert and oriented to person, place, and time. Mental status is at baseline.             Significant Labs: All pertinent labs within the past 24 hours have been reviewed.    Significant Imaging: I have reviewed all pertinent imaging results/findings within the past 24 hours.

## 2024-01-17 NOTE — PLAN OF CARE
EVAL AND TX COMPLETED: pt performed transfers independently. Ambulated 550 ft with SPV/independence; slightly unsteady at start but likely due to decreased mobility while hospitalized. Resolved with increased gait. Recommend no further acute PT services

## 2024-01-17 NOTE — PROGRESS NOTES
[facility] [place] [facility]  Progress Note    Patient Name: Dallas Becker  MRN: [id number]  Patient Class: IP- Inpatient   Admission Date: 1/15/2024  Length of Stay: 1 days  Attending Physician: Magalys Mcgraw MD  Primary Care Provider: Lalita Primary Doctor        Subjective:     Principal Problem:Fever        HPI:  Dallas Becker is a 68 y.o. male with a PMH  has a past medical history of AAA (abdominal aortic aneurysm). who presented as a transfer from [facility] for higher level of care for treatment of fever and pneumonia. Patient initially seen in ED day prior and underwent I&D of buttocks abscess but presented back to the ED earlier today after developing acute onset fever with T-max measuring 101.0 at home. Patient reported no known alleviating or aggravating factors noted with associated symptoms including dyspnea, yellow/green productive cough, and chest pain with inspiration which progressively worsened over the past few days.  Patient reported having no significant past medical history and denied exposure to ill contacts, recent hospitalizations, recent travel, and denies use of home O2, nebulizers, CPAP, or underlying lung disease.  Initial workup in the ED revealed patient met SIRS criteria in setting of underlying pneumonia and recent buttocks abscess and was admitted to Hospital Medicine under observation for continued medical management.    PCP: Lalita, Primary Doctor      Overview/Hospital Course:  Continued on IV Abx for PNA and buttock abscess. Wound care consulted and following. CT Pelvis 01/17 showed no residual drainable abscess     Interval History: Tmax 101 overnight. Pt reports cough is less productive today. Denies CP, SOB. Still has some soreness to R buttock after wound care. CT Pelvis ordered to eval abscess     Review of Systems  Objective:     Vital Signs (Most Recent):  Temp: 98.4 °F (36.9 °C) (01/17/24 0744)  Pulse: 94 (01/17/24 0844)  Resp: 16 (01/17/24 0844)  BP: (!)  134/57 (01/17/24 0744)  SpO2: (!) 92 % (01/17/24 0844) Vital Signs (24h Range):  Temp:  [98.3 °F (36.8 °C)-101.4 °F (38.6 °C)] 98.4 °F (36.9 °C)  Pulse:  [84-97] 94  Resp:  [16-20] 16  SpO2:  [90 %-94 %] 92 %  BP: (105-141)/(57-66) 134/57     Weight: 79.5 kg (175 lb 4.3 oz)  Body mass index is 23.12 kg/m².    Intake/Output Summary (Last 24 hours) at 1/17/2024 1020  Last data filed at 1/17/2024 0051  Gross per 24 hour   Intake 120 ml   Output --   Net 120 ml         Physical Exam  Vitals and nursing note reviewed.   Constitutional:       General: He is not in acute distress.     Appearance: He is obese. He is ill-appearing.   Cardiovascular:      Rate and Rhythm: Normal rate and regular rhythm.      Heart sounds: No murmur heard.     No friction rub. No gallop.   Pulmonary:      Comments: Diminished breath sounds b/l, no WRR, on 2L NC O2   Abdominal:      General: Bowel sounds are normal. There is no distension.      Palpations: Abdomen is soft.      Tenderness: There is no abdominal tenderness.   Musculoskeletal:      Right lower leg: No edema.      Left lower leg: No edema.   Skin:     General: Skin is warm and dry.      Comments: R buttock dressing and packing in place, no drainage noted    Neurological:      Mental Status: He is alert and oriented to person, place, and time. Mental status is at baseline.             Significant Labs: All pertinent labs within the past 24 hours have been reviewed.    Significant Imaging: I have reviewed all pertinent imaging results/findings within the past 24 hours.    Assessment/Plan:      Pneumonia  - Continue empiric Levofloxacin   - sputum cx pending   - continue Guaifenesin, dose increase  - add saline nebs for clearance   - Wean supplemental O2 as tolerated, maintain sats > 90      Cellulitis and abscess of buttock  S/p I&D x 2 in the ED   CT Pelvis 01/17 showed no residual drainable abscess   Continue IV Vancomycin; Vanc dosing and monitoring per pharmacy   Continue local  wound care per wound care RN       Sepsis  This patient does have evidence of infective focus  My overall impression is sepsis.  Source: Respiratory and Skin and Soft Tissue (location buttock abscess), Flu/COVID negative.   Antibiotics given-   Antibiotics (72h ago, onward)      Start     Stop Route Frequency Ordered    01/16/24 2200  levoFLOXacin 750 mg/150 mL IVPB 750 mg         -- IV Every 24 hours (non-standard times) 01/16/24 0626    01/16/24 1700  vancomycin 2 g in dextrose 5 % 500 mL IVPB         -- IV Every 24 hours (non-standard times) 01/15/24 1858    01/15/24 1449  vancomycin - pharmacy to dose  (vancomycin IVPB (PEDS and ADULTS))        See Hyperspace for full Linked Orders Report.    -- IV pharmacy to manage frequency 01/15/24 1351          Latest lactate reviewed-  Recent Labs   Lab 01/15/24  1313   LACTATE 1.1       Organ dysfunction indicated by None     Fluid challenge Not needed - patient is not hypotensive      Post- resuscitation assessment No - Post resuscitation assessment not needed     Will Not start Pressors- Levophed for MAP of 65  Source control achieved by:  IV abx, s/p I&D in the ED       Dyspnea  Likely in setting of PNA   Continue abx as above   IS as mae, Sarah         VTE Risk Mitigation (From admission, onward)           Ordered     enoxaparin injection 40 mg  Daily         01/15/24 2055     IP VTE LOW RISK PATIENT  Once         01/15/24 2055     Place sequential compression device  Until discontinued         01/15/24 2055                    Discharge Planning   MATT:      Code Status: Full Code   Is the patient medically ready for discharge?:     Reason for patient still in hospital (select all that apply): Patient trending condition, Treatment, PT / OT recommendations, and Pending disposition  Discharge Plan A: Home with family                  Magalys Mcgraw MD  Department of Hospital Medicine   [facility] - Med Surg

## 2024-01-17 NOTE — PLAN OF CARE
IV ABT therapy remains in place. No adverse reactions noted, remains afebrile. Wound care performed as ordered. Pt denies any pain. 2L/NC. Remains free from incident/injury. Call light in reach. All active orders reviewed. Chart check complete.

## 2024-01-17 NOTE — ASSESSMENT & PLAN NOTE
- Continue empiric Levofloxacin   - sputum cx pending   - continue Guaifenesin, dose increase  - add saline nebs for clearance   - Wean supplemental O2 as tolerated, maintain sats > 90

## 2024-01-17 NOTE — PLAN OF CARE
Problem: Adult Inpatient Plan of Care  Goal: Plan of Care Review  Outcome: Ongoing, Progressing  Goal: Patient-Specific Goal (Individualized)  Outcome: Ongoing, Progressing  Goal: Absence of Hospital-Acquired Illness or Injury  Outcome: Ongoing, Progressing  Goal: Optimal Comfort and Wellbeing  Outcome: Ongoing, Progressing  Goal: Readiness for Transition of Care  Outcome: Ongoing, Progressing     Problem: Fluid Imbalance (Pneumonia)  Goal: Fluid Balance  Outcome: Ongoing, Progressing     Problem: Infection (Pneumonia)  Goal: Resolution of Infection Signs and Symptoms  Outcome: Ongoing, Progressing     Problem: Respiratory Compromise (Pneumonia)  Goal: Effective Oxygenation and Ventilation  Outcome: Ongoing, Progressing     Problem: Adjustment to Illness (Sepsis/Septic Shock)  Goal: Optimal Coping  Outcome: Ongoing, Progressing     Problem: Bleeding (Sepsis/Septic Shock)  Goal: Absence of Bleeding  Outcome: Ongoing, Progressing     Problem: Glycemic Control Impaired (Sepsis/Septic Shock)  Goal: Blood Glucose Level Within Desired Range  Outcome: Ongoing, Progressing     Problem: Infection Progression (Sepsis/Septic Shock)  Goal: Absence of Infection Signs and Symptoms  Outcome: Ongoing, Progressing     Problem: Nutrition Impaired (Sepsis/Septic Shock)  Goal: Optimal Nutrition Intake  Outcome: Ongoing, Progressing     Problem: Impaired Wound Healing  Goal: Optimal Wound Healing  Outcome: Ongoing, Progressing

## 2024-01-17 NOTE — PT/OT/SLP EVAL
"Physical Therapy Evaluation and Discharge Note    Patient Name:  Dallas Becker   MRN:  [id number]    Recommendations:     Discharge Recommendations: Moderate Intensity Therapy  Discharge Equipment Recommendations: none   Barriers to discharge:  pt is currently un-housed    Assessment:     Dallas Becker is a 68 y.o. male admitted with a medical diagnosis of Fever.  At this time, patient is performing all functional tasks independently acute PT services.     Recent Surgery: * No surgery found *      Plan:     During this hospitalization, patient does not require further acute PT services.  Please re-consult if situation changes.      Subjective     Chief Complaint: pt appears overwhelmed with current situation; requesting to know name of doctor and wound care schedule  Patient/Family Comments/goals: to get better  Pain/Comfort:  Pain Rating 1: 0/10 (denies pain but reports numbness/discomfort in lumbar area when sitting for long time; chronic issue)  Pain Rating Post-Intervention 1: 0/10    Patients cultural, spiritual, Roman Catholic conflicts given the current situation: no    Living Environment:  Pt is un-housed but reported when "I'm in [place], I stay with my first cousin"  Prior to admission, patients level of function was independent with all tasks.  Equipment used at home: none.  DME owned (not currently used): none.  Upon discharge, patient will have assistance from cousin.    Objective:     Communicated with nursing (Jesusita) and performed chart review via epic system prior to session.  Patient found sitting edge of bed with peripheral IV, telemetry upon PT entry to room.    General Precautions: Standard, fall    Orthopedic Precautions:N/A   Braces: N/A  Respiratory Status: Room air    Exams:  Cognitive Exam:  Patient is oriented to Person, Place, Time, and Situation, increased overwhelm/frustration when answering subjective questions  Gross Motor Coordination:  WFL  Postural Exam:  Patient presented " with the following abnormalities:    -       Rounded shoulders  -       Forward head  RLE ROM: WFL  RLE Strength: WFL  LLE ROM: WFL  LLE Strength: WFL    Functional Mobility:  Transfers:     Sit to Stand:  independence with no AD  Bed to Chair: independence with  no AD  using  Stand Pivot  Gait: 550ft SPV/independence, initial postural sway at start of gait but improved with time.  Balance: good    AM-PAC 6 CLICK MOBILITY  Total Score:21       Treatment and Education:  Educated pt on benefits of consistent participation in PT services to meet functional goals. Educated pt on importance of sitting OOB to promote endurance and overall activity tolerance. Educated pt on call don't fall policy and use of call button to alert nursing staff of needs (including to assist with returning back to bed). Pt expressed understanding.     AM-PAC 6 CLICK MOBILITY  Total Score:21     Patient left up in chair with all lines intact, call button in reach, and nursing notified.    GOALS:   Multidisciplinary Problems       Physical Therapy Goals          Problem: Physical Therapy    Goal Priority Disciplines Outcome Goal Variances Interventions   Physical Therapy Goal     PT, PT/OT      Description: No further PT at this time. Pt has no acute PT needs at this time. Pt appears to be performing all functional tasks independently.                       History:     Past Medical History:   Diagnosis Date    AAA (abdominal aortic aneurysm)     W/ REPAIR    Fever 01/16/2024       No past surgical history on file.    Time Tracking:     PT Received On: 01/17/24  PT Start Time: 1105     PT Stop Time: 1130  PT Total Time (min): 25 min     Billable Minutes: Evaluation 10 and Gait Training 15      01/17/2024

## 2024-01-17 NOTE — ASSESSMENT & PLAN NOTE
This patient does have evidence of infective focus  My overall impression is sepsis.  Source: Respiratory and Skin and Soft Tissue (location buttock abscess), Flu/COVID negative.   Antibiotics given-   Antibiotics (72h ago, onward)    Start     Stop Route Frequency Ordered    01/16/24 2200  levoFLOXacin 750 mg/150 mL IVPB 750 mg         -- IV Every 24 hours (non-standard times) 01/16/24 0626    01/16/24 1700  vancomycin 2 g in dextrose 5 % 500 mL IVPB         -- IV Every 24 hours (non-standard times) 01/15/24 1858    01/15/24 1449  vancomycin - pharmacy to dose  (vancomycin IVPB (PEDS and ADULTS))        See Hyperspace for full Linked Orders Report.    -- IV pharmacy to manage frequency 01/15/24 1351        Latest lactate reviewed-  Recent Labs   Lab 01/15/24  1313   LACTATE 1.1       Organ dysfunction indicated by None     Fluid challenge Not needed - patient is not hypotensive      Post- resuscitation assessment No - Post resuscitation assessment not needed     Will Not start Pressors- Levophed for MAP of 65  Source control achieved by:  IV abx, s/p I&D in the ED

## 2024-01-17 NOTE — ASSESSMENT & PLAN NOTE
S/p I&D x 2 in the ED   CT Pelvis 01/17 showed no residual drainable abscess   Continue IV Vancomycin; Vanc dosing and monitoring per pharmacy   Continue local wound care per wound care RN

## 2024-01-18 PROBLEM — I71.40 AAA (ABDOMINAL AORTIC ANEURYSM): Chronic | Status: ACTIVE | Noted: 2024-01-18

## 2024-01-18 LAB
ALBUMIN SERPL BCP-MCNC: 2.2 G/DL (ref 3.5–5.2)
ALP SERPL-CCNC: 106 U/L (ref 55–135)
ALT SERPL W/O P-5'-P-CCNC: 16 U/L (ref 10–44)
ANION GAP SERPL CALC-SCNC: 12 MMOL/L (ref 8–16)
AST SERPL-CCNC: 19 U/L (ref 10–40)
BASOPHILS # BLD AUTO: 0.08 K/UL (ref 0–0.2)
BASOPHILS NFR BLD: 0.4 % (ref 0–1.9)
BILIRUB SERPL-MCNC: 0.8 MG/DL (ref 0.1–1)
BUN SERPL-MCNC: 12 MG/DL (ref 8–23)
CALCIUM SERPL-MCNC: 8.6 MG/DL (ref 8.7–10.5)
CHLORIDE SERPL-SCNC: 98 MMOL/L (ref 95–110)
CO2 SERPL-SCNC: 23 MMOL/L (ref 23–29)
CREAT SERPL-MCNC: 0.9 MG/DL (ref 0.5–1.4)
DIFFERENTIAL METHOD BLD: ABNORMAL
EOSINOPHIL # BLD AUTO: 0.1 K/UL (ref 0–0.5)
EOSINOPHIL NFR BLD: 0.4 % (ref 0–8)
ERYTHROCYTE [DISTWIDTH] IN BLOOD BY AUTOMATED COUNT: 13.3 % (ref 11.5–14.5)
EST. GFR  (NO RACE VARIABLE): >60 ML/MIN/1.73 M^2
GLUCOSE SERPL-MCNC: 120 MG/DL (ref 70–110)
HCT VFR BLD AUTO: 32.2 % (ref 40–54)
HGB BLD-MCNC: 10.7 G/DL (ref 14–18)
IMM GRANULOCYTES # BLD AUTO: 0.14 K/UL (ref 0–0.04)
IMM GRANULOCYTES NFR BLD AUTO: 0.7 % (ref 0–0.5)
LYMPHOCYTES # BLD AUTO: 1.3 K/UL (ref 1–4.8)
LYMPHOCYTES NFR BLD: 6.9 % (ref 18–48)
MCH RBC QN AUTO: 29.1 PG (ref 27–31)
MCHC RBC AUTO-ENTMCNC: 33.2 G/DL (ref 32–36)
MCV RBC AUTO: 88 FL (ref 82–98)
MONOCYTES # BLD AUTO: 1.3 K/UL (ref 0.3–1)
MONOCYTES NFR BLD: 6.6 % (ref 4–15)
NEUTROPHILS # BLD AUTO: 16.3 K/UL (ref 1.8–7.7)
NEUTROPHILS NFR BLD: 85 % (ref 38–73)
NRBC BLD-RTO: 0 /100 WBC
PLATELET # BLD AUTO: 414 K/UL (ref 150–450)
PMV BLD AUTO: 10.7 FL (ref 9.2–12.9)
POTASSIUM SERPL-SCNC: 4 MMOL/L (ref 3.5–5.1)
PROT SERPL-MCNC: 7 G/DL (ref 6–8.4)
RBC # BLD AUTO: 3.68 M/UL (ref 4.6–6.2)
SODIUM SERPL-SCNC: 133 MMOL/L (ref 136–145)
VANCOMYCIN TROUGH SERPL-MCNC: 17.1 UG/ML (ref 10–22)
WBC # BLD AUTO: 19.22 K/UL (ref 3.9–12.7)

## 2024-01-18 PROCEDURE — 94799 UNLISTED PULMONARY SVC/PX: CPT | Mod: XB

## 2024-01-18 PROCEDURE — 36415 COLL VENOUS BLD VENIPUNCTURE: CPT | Performed by: HOSPITALIST

## 2024-01-18 PROCEDURE — 85025 COMPLETE CBC W/AUTO DIFF WBC: CPT | Performed by: HOSPITALIST

## 2024-01-18 PROCEDURE — 63600175 PHARM REV CODE 636 W HCPCS: Performed by: INTERNAL MEDICINE

## 2024-01-18 PROCEDURE — 27000221 HC OXYGEN, UP TO 24 HOURS

## 2024-01-18 PROCEDURE — 80202 ASSAY OF VANCOMYCIN: CPT | Performed by: INTERNAL MEDICINE

## 2024-01-18 PROCEDURE — 11000001 HC ACUTE MED/SURG PRIVATE ROOM

## 2024-01-18 PROCEDURE — 25000242 PHARM REV CODE 250 ALT 637 W/ HCPCS: Performed by: INTERNAL MEDICINE

## 2024-01-18 PROCEDURE — 25000003 PHARM REV CODE 250: Performed by: HOSPITALIST

## 2024-01-18 PROCEDURE — 63600175 PHARM REV CODE 636 W HCPCS: Performed by: HOSPITALIST

## 2024-01-18 PROCEDURE — 94761 N-INVAS EAR/PLS OXIMETRY MLT: CPT

## 2024-01-18 PROCEDURE — 25000003 PHARM REV CODE 250: Performed by: INTERNAL MEDICINE

## 2024-01-18 PROCEDURE — 94640 AIRWAY INHALATION TREATMENT: CPT

## 2024-01-18 PROCEDURE — 36415 COLL VENOUS BLD VENIPUNCTURE: CPT | Mod: XB | Performed by: INTERNAL MEDICINE

## 2024-01-18 PROCEDURE — 99900035 HC TECH TIME PER 15 MIN (STAT)

## 2024-01-18 PROCEDURE — 80053 COMPREHEN METABOLIC PANEL: CPT | Performed by: HOSPITALIST

## 2024-01-18 RX ADMIN — SODIUM CHLORIDE 30 MG/ML INHALATION SOLUTION 4 ML: 30 SOLUTION INHALANT at 07:01

## 2024-01-18 RX ADMIN — CEFTRIAXONE SODIUM 2 G: 2 INJECTION, POWDER, FOR SOLUTION INTRAMUSCULAR; INTRAVENOUS at 11:01

## 2024-01-18 RX ADMIN — ENOXAPARIN SODIUM 40 MG: 40 INJECTION SUBCUTANEOUS at 04:01

## 2024-01-18 RX ADMIN — IPRATROPIUM BROMIDE AND ALBUTEROL SULFATE 3 ML: .5; 3 SOLUTION RESPIRATORY (INHALATION) at 07:01

## 2024-01-18 RX ADMIN — VANCOMYCIN HYDROCHLORIDE 1250 MG: 1.25 INJECTION, POWDER, LYOPHILIZED, FOR SOLUTION INTRAVENOUS at 10:01

## 2024-01-18 RX ADMIN — GUAIFENESIN 1200 MG: 600 TABLET, EXTENDED RELEASE ORAL at 09:01

## 2024-01-18 RX ADMIN — FLUTICASONE PROPIONATE 100 MCG: 50 SPRAY, METERED NASAL at 09:01

## 2024-01-18 RX ADMIN — ALUMINUM HYDROXIDE, MAGNESIUM HYDROXIDE, AND DIMETHICONE 30 ML: 200; 20; 200 SUSPENSION ORAL at 03:01

## 2024-01-18 RX ADMIN — VANCOMYCIN HYDROCHLORIDE 1500 MG: 1.5 INJECTION, POWDER, LYOPHILIZED, FOR SOLUTION INTRAVENOUS at 09:01

## 2024-01-18 RX ADMIN — SODIUM CHLORIDE: 9 INJECTION, SOLUTION INTRAVENOUS at 10:01

## 2024-01-18 NOTE — PT/OT/SLP EVAL
Occupational Therapy   Evaluation and Discharge Note    Name: Dallas Becker  MRN: [id number]  Admitting Diagnosis: Fever  Recent Surgery: * No surgery found *      Recommendations:     Discharge Recommendations: No Therapy Indicated  Discharge Equipment Recommendations: none  Barriers to discharge:  Inaccessible home environment, Decreased caregiver support (pt reports he is homeless but stays with his cousin when he is in [place])    Assessment:     Dallas Becker is a 68 y.o. male with a medical diagnosis of Fever. At this time, patient is functioning at their prior level of function and does not require further acute OT services.     Plan:     During this hospitalization, patient does not require further acute OT services.  Please re-consult if situation changes.    Plan of Care Reviewed with: patient    Subjective     Chief Complaint: nausea, pt appears overwhelmed  Patient/Family Comments/goals: get better    Occupational Profile:  Living Environment: pt reports he is homeless but stays with his cousin in a double wide trailer with 5 steps and railings to enter when he is in [place].  Previous level of function: Pt (I) with ADLs and functional mobility.  Roles and Routines: able to drive  Equipment Used at home: none  Assistance upon Discharge: cousin    Pain/Comfort:  Pain Rating 1: 0/10    Objective:     Communicated with: Nurse and epic chart review prior to session.  Patient found sitting edge of bed with peripheral IV, telemetry, oxygen upon OT entry to room.    General Precautions: Standard, fall  Orthopedic Precautions: N/A  Braces: N/A  Respiratory Status: Nasal cannula, flow 1.5 L/min     Functional Mobility/Transfers:  Patient completed Sit <> Stand Transfer with independence  with  no assistive device   Patient completed Bed <> Chair Transfer using Stand Pivot technique with independence with no assistive device  Functional Mobility: Patient completed x400ft functional mobility with SPV  and no AD to increase dynamic standing balance and activity tolerance needed for ADL completion.     Activities of Daily Living:  Upper Body Dressing: independence odessa gown    Cognitive/Visual Perceptual:  Cognitive/Psychosocial Skills:     -       Oriented to: Person, Place, Time, and Situation   -       Follows Commands/attention:Follows multistep  commands  -       Communication: clear/fluent  -       Memory: No Deficits noted  -       Safety awareness/insight to disability: intact     Physical Exam:  Sensation:    -       Impaired  pt reports numbness/discomfort in lumbar area when sitting for prolonged time; chronic  Upper Extremity Range of Motion:     -       Right Upper Extremity: WNL  -       Left Upper Extremity: WNL  Upper Extremity Strength:    -       Right Upper Extremity: WFL  -       Left Upper Extremity: WFL   Strength:    -       Right Upper Extremity: WFL  -       Left Upper Extremity: WFL    AMPAC 6 Click ADL:  AMPAC Total Score: 24    Treatment & Education:  Patient educated on role of OT in acute setting and benefits of participation. Educated on techniques to use to increase independence and decrease fall risk with functional transfers. Educated on importance of OOB activity and calling for A to transfer back to bed. Encouraged completion of B UE AROM therex throughout the day to tolerance to increase functional strength and activity tolerance. Educated patient on importance of increased tolerance to upright position and direct impact on CV endurance and strength. Patient encouraged to sit up in chair for a minimum of 2 consecutive hours per day. Educated pt on pursed lip breathing and importance of activity pacing for SOB. Patient stated understanding and in agreement with POC.     Patient left up in chair with all lines intact and call button in reach    GOALS:   Multidisciplinary Problems       Occupational Therapy Goals       Not on file                    History:     Past Medical  History:   Diagnosis Date    AAA (abdominal aortic aneurysm)     W/ REPAIR    Fever 01/16/2024       No past surgical history on file.    Time Tracking:     OT Date of Treatment: 01/17/24  OT Start Time: 1105  OT Stop Time: 1130  OT Total Time (min): 25 min    Billable Minutes:Evaluation 15  Therapeutic Activity 10    1/17/2024  Sasha Hanks, OT

## 2024-01-18 NOTE — ASSESSMENT & PLAN NOTE
This patient does have evidence of infective focus  My overall impression is sepsis.  Source: Respiratory and Skin and Soft Tissue (location buttock abscess), Flu/COVID negative.   Antibiotics given-   Antibiotics (72h ago, onward)    Start     Stop Route Frequency Ordered    01/18/24 1215  cefTRIAXone (ROCEPHIN) 2 g in dextrose 5 % in water (D5W) 100 mL IVPB (MB+)         -- IV Every 24 hours (non-standard times) 01/18/24 1113    01/17/24 2130  vancomycin 1,500 mg in dextrose 5 % (D5W) 250 mL IVPB (Vial-Mate)         -- IV Every 12 hours (non-standard times) 01/17/24 2102    01/15/24 1449  vancomycin - pharmacy to dose  (vancomycin IVPB (PEDS and ADULTS))        See Hyperspace for full Linked Orders Report.    -- IV pharmacy to manage frequency 01/15/24 1351        Latest lactate reviewed-  Recent Labs   Lab 01/15/24  1313   LACTATE 1.1       Organ dysfunction indicated by None     Fluid challenge Not needed - patient is not hypotensive      Post- resuscitation assessment No - Post resuscitation assessment not needed     Will Not start Pressors- Levophed for MAP of 65  Source control achieved by:  IV abx, s/p I&D in the ED

## 2024-01-18 NOTE — ASSESSMENT & PLAN NOTE
S/p I&D x 2 in the ED   CT Pelvis 01/17 showed no residual drainable abscess   Continue IV Vancomycin; Vanc dosing and monitoring per pharmacy   Continue local wound care per wound care RN   Anticipate transition to PO abx in AM if WBC continues to downtrend

## 2024-01-18 NOTE — PLAN OF CARE
OT eval completed.  Pt (I) with t/fs and SPV with ambulation 400ft with no AD.  Pt does not require skilled acute OT services.  Discharge from OT.

## 2024-01-18 NOTE — CONSULTS
Pharmacokinetic Assessment Follow Up: IV Vancomycin    Vancomycin serum concentration assessment(s):    The trough level was drawn correctly and can be used to guide therapy at this time. The measurement is below the desired definitive target range of 15 to 20 mcg/mL.    Vancomycin Regimen Plan:    Change regimen to Vancomycin 1500 mg IV every 12 hours with next serum trough concentration measured at 2030 prior to 3rd new dose on 1/18/24.    Drug levels (last 3 results):  Recent Labs   Lab Result Units 01/17/24  2018   Vancomycin-Trough ug/mL 5.6*     Pharmacy will continue to follow and monitor vancomycin.    Please contact pharmacy at extension [phone] for questions regarding this assessment.    Thank you for the consult,   Barb Berman, Cheryl     Patient brief summary:  Dallas Becker is a 68 y.o. male initiated on antimicrobial therapy with IV Vancomycin for treatment of lower respiratory infection: PNA; Cellulitis and abscess of buttock s/p I&D     The patient's current regimen is Vancomycin 2 g IV every 24 hours.     Drug Allergies:   Review of patient's allergies indicates:  No Known Allergies    Actual Body Weight: 79.5 kg    Renal Function:   Estimated Creatinine Clearance: 99.4 mL/min (based on SCr of 0.8 mg/dL).,     Dialysis Method (if applicable): N/A    CBC (last 72 hours):  Recent Labs   Lab Result Units 01/15/24  1313 01/16/24  0611 01/17/24  0444   WBC K/uL 23.41* 23.36* 24.22*   Hemoglobin g/dL 12.3* 12.2* 11.7*   Hematocrit % 36.7* 36.0* 34.9*   Platelets K/uL 374 339 394   Gran % % 89.9* 86.8* 88.8*   Lymph % % 3.8* 5.4* 4.0*   Mono % % 5.1 6.4 6.0   Eosinophil % % 0.2 0.4 0.2   Basophil % % 0.3 0.4 0.3   Differential Method  Automated Automated Automated       Metabolic Panel (last 72 hours):  Recent Labs   Lab Result Units 01/15/24  1313 01/16/24  0611 01/17/24  0444   Sodium mmol/L 132* 134* 134*   Potassium mmol/L 3.9 4.3 3.9   Chloride mmol/L 99 100 101   CO2 mmol/L 21* 22* 23   Glucose  mg/dL 140* 102 120*   BUN mg/dL 12 10 11   Creatinine mg/dL 0.9 0.9 0.8   Albumin g/dL 2.9* 2.6* 2.3*   Total Bilirubin mg/dL 1.1* 1.1* 0.8   Alkaline Phosphatase U/L 88 82 98   AST U/L 14 12 13   ALT U/L 13 10 10       Vancomycin Administrations:  vancomycin given in the last 96 hours                     vancomycin 2 g in dextrose 5 % 500 mL IVPB (mg) 2,000 mg New Bag 01/16/24 2054    vancomycin (VANCOCIN) 1,000 mg in dextrose 5 % (D5W) 250 mL IVPB (Vial-Mate) (mg) 1,000 mg New Bag 01/15/24 1813    vancomycin (VANCOCIN) 1,000 mg in dextrose 5 % (D5W) 250 mL IVPB (Vial-Mate) (mg) 1,000 mg New Bag 01/15/24 1454                    Microbiologic Results:  Microbiology Results (last 7 days)       Procedure Component Value Units Date/Time    Blood culture x two cultures. Draw prior to antibiotics. [[id number]] Collected: 01/15/24 1312    Order Status: Completed Specimen: Blood from Peripheral, Hand, Left Updated: 01/17/24 0612     Blood Culture, Routine No Growth to date      No Growth to date    Narrative:      Aerobic and anaerobic    Blood culture x two cultures. Draw prior to antibiotics. [[id number]] Collected: 01/15/24 1305    Order Status: Completed Specimen: Blood from Peripheral, Antecubital, Left Updated: 01/17/24 0612     Blood Culture, Routine No Growth to date      No Growth to date    Narrative:      Aerobic and anaerobic    Culture, Respiratory with Gram Stain [[id number]] Collected: 01/16/24 1437    Order Status: Sent Specimen: Respiratory from Sputum, Expectorated Updated: 01/16/24 1437          Thank you for allowing us to participate in this patient's care.     Barb Berman, Cheryl 01/17/2024 9:10 PM

## 2024-01-18 NOTE — ASSESSMENT & PLAN NOTE
- Change from levofloxacin to Ceftriaxone given hx of AAA   - sputum cx pending   - continue Guaifenesin +saline nebs for pulm  toilet   - Wean supplemental O2 as tolerated, maintain sats > 90

## 2024-01-18 NOTE — PLAN OF CARE
Discussed poc with pt, pt verbalized understanding    Purposeful rounding every 2hours    VS wnl  Fall precautions in place, remains injury free  Pt denies c/o pain and/or nausea      Abx given as prescribed  Bed locked at lowest position  Call light within reach    Chart check complete  Will cont with POC

## 2024-01-18 NOTE — PROGRESS NOTES
[facility]  Progress Note    Patient Name: Dallas Becker  MRN: [id number]  Patient Class: IP- Inpatient   Admission Date: 1/15/2024  Length of Stay: 2 days  Attending Physician: Magalys Mcgraw MD  Primary Care Provider: Lalita Primary Doctor        Subjective:     Principal Problem:Fever        HPI:  Dallas Becker is a 68 y.o. male with a PMH  has a past medical history of AAA (abdominal aortic aneurysm). who presented as a transfer from [facility] for higher level of care for treatment of fever and pneumonia. Patient initially seen in ED day prior and underwent I&D of buttocks abscess but presented back to the ED earlier today after developing acute onset fever with T-max measuring 101.0 at home. Patient reported no known alleviating or aggravating factors noted with associated symptoms including dyspnea, yellow/green productive cough, and chest pain with inspiration which progressively worsened over the past few days.  Patient reported having no significant past medical history and denied exposure to ill contacts, recent hospitalizations, recent travel, and denies use of home O2, nebulizers, CPAP, or underlying lung disease.  Initial workup in the ED revealed patient met SIRS criteria in setting of underlying pneumonia and recent buttocks abscess and was admitted to Hospital Medicine under observation for continued medical management.    PCP: Lalita Primary Doctor      Overview/Hospital Course:  Continued on IV Abx for PNA and buttock abscess. Wound care consulted and following. CT Pelvis 01/17 showed no residual drainable abscess     Interval History: NAEON, pt afebrile. Reports he feels ok, cough is improved with saline nebs. Denies CP. Has dyspnea with exertion.     Review of Systems  Objective:     Vital Signs (Most Recent):  Temp: 98.2 °F (36.8 °C) (01/18/24 0742)  Pulse: 84 (01/18/24 0742)  Resp: 18 (01/18/24 0742)  BP: (!) 106/57 (01/18/24 0742)  SpO2: (!) 92 % (01/18/24 0742)  Vital Signs (24h Range):  Temp:  [97.6 °F (36.4 °C)-99 °F (37.2 °C)] 98.2 °F (36.8 °C)  Pulse:  [78-96] 84  Resp:  [16-20] 18  SpO2:  [90 %-95 %] 92 %  BP: ()/(50-60) 106/57     Weight: 79.5 kg (175 lb 4.3 oz)  Body mass index is 23.12 kg/m².    Intake/Output Summary (Last 24 hours) at 1/18/2024 1106  Last data filed at 1/18/2024 0844  Gross per 24 hour   Intake 1685.66 ml   Output --   Net 1685.66 ml         Physical Exam  Vitals and nursing note reviewed.   Constitutional:       General: He is not in acute distress.     Appearance: He is ill-appearing.   Cardiovascular:      Rate and Rhythm: Normal rate and regular rhythm.      Heart sounds: No murmur heard.     No friction rub. No gallop.   Pulmonary:      Effort: Pulmonary effort is normal.      Breath sounds: No wheezing, rhonchi or rales.      Comments: Breath sounds diminished at bases, on 2L NC O2   Abdominal:      General: Bowel sounds are normal. There is no distension.      Palpations: Abdomen is soft.      Tenderness: There is no abdominal tenderness. There is no guarding or rebound.   Musculoskeletal:      Right lower leg: No edema.      Left lower leg: No edema.   Skin:     General: Skin is warm and dry.      Comments: R buttock area with less induration and erythema, packing in place    Neurological:      Mental Status: He is alert and oriented to person, place, and time. Mental status is at baseline.             Significant Labs: All pertinent labs within the past 24 hours have been reviewed.    Significant Imaging: I have reviewed all pertinent imaging results/findings within the past 24 hours.      Assessment/Plan:      Pneumonia  - Change from levofloxacin to Ceftriaxone given hx of AAA   - sputum cx pending   - continue Guaifenesin +saline nebs for pulm  toilet   - Wean supplemental O2 as tolerated, maintain sats > 90      Cellulitis and abscess of buttock  S/p I&D x 2 in the ED   CT Pelvis 01/17 showed no residual drainable abscess    Continue IV Vancomycin; Vanc dosing and monitoring per pharmacy   Continue local wound care per wound care RN   Anticipate transition to PO abx in AM if WBC continues to downtrend       Sepsis  This patient does have evidence of infective focus  My overall impression is sepsis.  Source: Respiratory and Skin and Soft Tissue (location buttock abscess), Flu/COVID negative.   Antibiotics given-   Antibiotics (72h ago, onward)      Start     Stop Route Frequency Ordered    01/18/24 1215  cefTRIAXone (ROCEPHIN) 2 g in dextrose 5 % in water (D5W) 100 mL IVPB (MB+)         -- IV Every 24 hours (non-standard times) 01/18/24 1113    01/17/24 2130  vancomycin 1,500 mg in dextrose 5 % (D5W) 250 mL IVPB (Vial-Mate)         -- IV Every 12 hours (non-standard times) 01/17/24 2102    01/15/24 1449  vancomycin - pharmacy to dose  (vancomycin IVPB (PEDS and ADULTS))        See Hyperspace for full Linked Orders Report.    -- IV pharmacy to manage frequency 01/15/24 1351          Latest lactate reviewed-  Recent Labs   Lab 01/15/24  1313   LACTATE 1.1       Organ dysfunction indicated by None     Fluid challenge Not needed - patient is not hypotensive      Post- resuscitation assessment No - Post resuscitation assessment not needed     Will Not start Pressors- Levophed for MAP of 65  Source control achieved by:  IV abx, s/p I&D in the ED       Dyspnea  Likely in setting of PNA   Continue abx as above   IS as able, Guaif       AAA (abdominal aortic aneurysm)  - POA, noted on CT pelvis, 5.3 cm diameter   - pt reports known Hx of AAA, was prev seen by Vascular Dr. Carlos Enrique Zavala in [place] but was lost to followup due to insurance issues  - will refer to vascular surgery on discharge to establish care        VTE Risk Mitigation (From admission, onward)           Ordered     enoxaparin injection 40 mg  Daily         01/15/24 2055     IP VTE LOW RISK PATIENT  Once         01/15/24 2055     Place sequential compression device  Until  discontinued         01/15/24 2055                    Discharge Planning   MATT:      Code Status: Full Code   Is the patient medically ready for discharge?: No    Reason for patient still in hospital (select all that apply): Patient trending condition and Treatment  Discharge Plan A: Home with family                  Magalys Mcgraw MD  Department of Hospital Medicine   '[facility]

## 2024-01-18 NOTE — SUBJECTIVE & OBJECTIVE
Interval History: NAEON, pt afebrile. Reports he feels ok, cough is improved with saline nebs. Denies CP. Has dyspnea with exertion.     Review of Systems  Objective:     Vital Signs (Most Recent):  Temp: 98.2 °F (36.8 °C) (01/18/24 0742)  Pulse: 84 (01/18/24 0742)  Resp: 18 (01/18/24 0742)  BP: (!) 106/57 (01/18/24 0742)  SpO2: (!) 92 % (01/18/24 0742) Vital Signs (24h Range):  Temp:  [97.6 °F (36.4 °C)-99 °F (37.2 °C)] 98.2 °F (36.8 °C)  Pulse:  [78-96] 84  Resp:  [16-20] 18  SpO2:  [90 %-95 %] 92 %  BP: ()/(50-60) 106/57     Weight: 79.5 kg (175 lb 4.3 oz)  Body mass index is 23.12 kg/m².    Intake/Output Summary (Last 24 hours) at 1/18/2024 1106  Last data filed at 1/18/2024 0844  Gross per 24 hour   Intake 1685.66 ml   Output --   Net 1685.66 ml         Physical Exam  Vitals and nursing note reviewed.   Constitutional:       General: He is not in acute distress.     Appearance: He is ill-appearing.   Cardiovascular:      Rate and Rhythm: Normal rate and regular rhythm.      Heart sounds: No murmur heard.     No friction rub. No gallop.   Pulmonary:      Effort: Pulmonary effort is normal.      Breath sounds: No wheezing, rhonchi or rales.      Comments: Breath sounds diminished at bases, on 2L NC O2   Abdominal:      General: Bowel sounds are normal. There is no distension.      Palpations: Abdomen is soft.      Tenderness: There is no abdominal tenderness. There is no guarding or rebound.   Musculoskeletal:      Right lower leg: No edema.      Left lower leg: No edema.   Skin:     General: Skin is warm and dry.      Comments: R buttock area with less induration and erythema, packing in place    Neurological:      Mental Status: He is alert and oriented to person, place, and time. Mental status is at baseline.             Significant Labs: All pertinent labs within the past 24 hours have been reviewed.    Significant Imaging: I have reviewed all pertinent imaging results/findings within the past 24  hours.

## 2024-01-18 NOTE — PLAN OF CARE
Discussed poc with pt, pt verbalized understanding  Purposeful rounding every 2hours  VS wnl  Blood glucose monitoring   Fall precautions in place, remains injury free  IVFs  Accurate I&Os  Abx given as prescribed  Bed locked at lowest position  Call light within reach  Chart check complete  Will cont with POC    Problem: Adult Inpatient Plan of Care  Goal: Plan of Care Review  Outcome: Ongoing, Progressing  Goal: Patient-Specific Goal (Individualized)  Outcome: Ongoing, Progressing  Goal: Absence of Hospital-Acquired Illness or Injury  Outcome: Ongoing, Progressing  Goal: Optimal Comfort and Wellbeing  Outcome: Ongoing, Progressing  Goal: Readiness for Transition of Care  Outcome: Ongoing, Progressing     Problem: Fluid Imbalance (Pneumonia)  Goal: Fluid Balance  Outcome: Ongoing, Progressing     Problem: Infection (Pneumonia)  Goal: Resolution of Infection Signs and Symptoms  Outcome: Ongoing, Progressing     Problem: Respiratory Compromise (Pneumonia)  Goal: Effective Oxygenation and Ventilation  Outcome: Ongoing, Progressing     Problem: Adjustment to Illness (Sepsis/Septic Shock)  Goal: Optimal Coping  Outcome: Ongoing, Progressing     Problem: Bleeding (Sepsis/Septic Shock)  Goal: Absence of Bleeding  Outcome: Ongoing, Progressing     Problem: Glycemic Control Impaired (Sepsis/Septic Shock)  Goal: Blood Glucose Level Within Desired Range  Outcome: Ongoing, Progressing     Problem: Infection Progression (Sepsis/Septic Shock)  Goal: Absence of Infection Signs and Symptoms  Outcome: Ongoing, Progressing     Problem: Nutrition Impaired (Sepsis/Septic Shock)  Goal: Optimal Nutrition Intake  Outcome: Ongoing, Progressing     Problem: Impaired Wound Healing  Goal: Optimal Wound Healing  Outcome: Ongoing, Progressing     Problem: Fall Injury Risk  Goal: Absence of Fall and Fall-Related Injury  Outcome: Ongoing, Progressing

## 2024-01-18 NOTE — ASSESSMENT & PLAN NOTE
- POA, noted on CT pelvis, 5.3 cm diameter   - pt reports known Hx of AAA, was prev seen by Vascular Dr. Carlos Enrique Zavala in [place] but was lost to followup due to insurance issues  - will refer to vascular surgery on discharge to establish care

## 2024-01-19 VITALS
RESPIRATION RATE: 20 BRPM | DIASTOLIC BLOOD PRESSURE: 58 MMHG | HEIGHT: 73 IN | HEART RATE: 85 BPM | TEMPERATURE: 98 F | SYSTOLIC BLOOD PRESSURE: 107 MMHG | OXYGEN SATURATION: 95 % | WEIGHT: 175.25 LBS | BODY MASS INDEX: 23.23 KG/M2

## 2024-01-19 PROBLEM — E87.1 HYPONATREMIA: Status: ACTIVE | Noted: 2024-01-19

## 2024-01-19 LAB
ANION GAP SERPL CALC-SCNC: 3 MMOL/L (ref 8–16)
BASOPHILS # BLD AUTO: 0.05 K/UL (ref 0–0.2)
BASOPHILS NFR BLD: 0.3 % (ref 0–1.9)
BUN SERPL-MCNC: 10 MG/DL (ref 8–23)
CALCIUM SERPL-MCNC: 8.5 MG/DL (ref 8.7–10.5)
CHLORIDE SERPL-SCNC: 99 MMOL/L (ref 95–110)
CO2 SERPL-SCNC: 28 MMOL/L (ref 23–29)
CREAT SERPL-MCNC: 0.8 MG/DL (ref 0.5–1.4)
DIFFERENTIAL METHOD BLD: ABNORMAL
EOSINOPHIL # BLD AUTO: 0.1 K/UL (ref 0–0.5)
EOSINOPHIL NFR BLD: 0.5 % (ref 0–8)
ERYTHROCYTE [DISTWIDTH] IN BLOOD BY AUTOMATED COUNT: 13.6 % (ref 11.5–14.5)
EST. GFR  (NO RACE VARIABLE): >60 ML/MIN/1.73 M^2
GLUCOSE SERPL-MCNC: 121 MG/DL (ref 70–110)
HCT VFR BLD AUTO: 30.7 % (ref 40–54)
HGB BLD-MCNC: 10.5 G/DL (ref 14–18)
IMM GRANULOCYTES # BLD AUTO: 0.14 K/UL (ref 0–0.04)
IMM GRANULOCYTES NFR BLD AUTO: 0.8 % (ref 0–0.5)
LYMPHOCYTES # BLD AUTO: 1.3 K/UL (ref 1–4.8)
LYMPHOCYTES NFR BLD: 7.3 % (ref 18–48)
MCH RBC QN AUTO: 29.7 PG (ref 27–31)
MCHC RBC AUTO-ENTMCNC: 34.2 G/DL (ref 32–36)
MCV RBC AUTO: 87 FL (ref 82–98)
MONOCYTES # BLD AUTO: 1.3 K/UL (ref 0.3–1)
MONOCYTES NFR BLD: 7.5 % (ref 4–15)
NEUTROPHILS # BLD AUTO: 14.3 K/UL (ref 1.8–7.7)
NEUTROPHILS NFR BLD: 83.6 % (ref 38–73)
NRBC BLD-RTO: 0 /100 WBC
PLATELET # BLD AUTO: 395 K/UL (ref 150–450)
PMV BLD AUTO: 10.6 FL (ref 9.2–12.9)
POTASSIUM SERPL-SCNC: 4 MMOL/L (ref 3.5–5.1)
RBC # BLD AUTO: 3.53 M/UL (ref 4.6–6.2)
SODIUM SERPL-SCNC: 130 MMOL/L (ref 136–145)
WBC # BLD AUTO: 17.17 K/UL (ref 3.9–12.7)

## 2024-01-19 PROCEDURE — 25000003 PHARM REV CODE 250: Performed by: INTERNAL MEDICINE

## 2024-01-19 PROCEDURE — 99900035 HC TECH TIME PER 15 MIN (STAT)

## 2024-01-19 PROCEDURE — 94618 PULMONARY STRESS TESTING: CPT

## 2024-01-19 PROCEDURE — 94799 UNLISTED PULMONARY SVC/PX: CPT | Mod: XB

## 2024-01-19 PROCEDURE — 25000242 PHARM REV CODE 250 ALT 637 W/ HCPCS: Performed by: INTERNAL MEDICINE

## 2024-01-19 PROCEDURE — 80048 BASIC METABOLIC PNL TOTAL CA: CPT | Performed by: INTERNAL MEDICINE

## 2024-01-19 PROCEDURE — 27000221 HC OXYGEN, UP TO 24 HOURS

## 2024-01-19 PROCEDURE — 36415 COLL VENOUS BLD VENIPUNCTURE: CPT | Performed by: INTERNAL MEDICINE

## 2024-01-19 PROCEDURE — 94761 N-INVAS EAR/PLS OXIMETRY MLT: CPT

## 2024-01-19 PROCEDURE — 94640 AIRWAY INHALATION TREATMENT: CPT

## 2024-01-19 PROCEDURE — 85025 COMPLETE CBC W/AUTO DIFF WBC: CPT | Performed by: INTERNAL MEDICINE

## 2024-01-19 RX ORDER — AMOXICILLIN 250 MG
1 CAPSULE ORAL 2 TIMES DAILY PRN
Qty: 14 TABLET | Refills: 0 | Status: SHIPPED | OUTPATIENT
Start: 2024-01-19 | End: 2024-01-26

## 2024-01-19 RX ORDER — GUAIFENESIN 600 MG/1
600 TABLET, EXTENDED RELEASE ORAL 2 TIMES DAILY
Qty: 14 TABLET | Refills: 0 | Status: SHIPPED | OUTPATIENT
Start: 2024-01-19 | End: 2024-01-26

## 2024-01-19 RX ORDER — AMOXICILLIN AND CLAVULANATE POTASSIUM 875; 125 MG/1; MG/1
1 TABLET, FILM COATED ORAL EVERY 12 HOURS
Qty: 10 TABLET | Refills: 0 | Status: SHIPPED | OUTPATIENT
Start: 2024-01-19 | End: 2024-01-24

## 2024-01-19 RX ORDER — DOXYCYCLINE HYCLATE 100 MG
100 TABLET ORAL EVERY 12 HOURS
Qty: 12 TABLET | Refills: 0 | Status: SHIPPED | OUTPATIENT
Start: 2024-01-19 | End: 2024-01-25

## 2024-01-19 RX ORDER — ALBUTEROL SULFATE 90 UG/1
2 AEROSOL, METERED RESPIRATORY (INHALATION) EVERY 6 HOURS PRN
Qty: 18 G | Refills: 0 | Status: SHIPPED | OUTPATIENT
Start: 2024-01-19 | End: 2025-01-18

## 2024-01-19 RX ORDER — DOXYCYCLINE HYCLATE 100 MG
100 TABLET ORAL EVERY 12 HOURS
Status: DISCONTINUED | OUTPATIENT
Start: 2024-01-19 | End: 2024-01-19 | Stop reason: HOSPADM

## 2024-01-19 RX ORDER — AMOXICILLIN AND CLAVULANATE POTASSIUM 875; 125 MG/1; MG/1
1 TABLET, FILM COATED ORAL EVERY 12 HOURS
Status: DISCONTINUED | OUTPATIENT
Start: 2024-01-19 | End: 2024-01-19 | Stop reason: HOSPADM

## 2024-01-19 RX ADMIN — IPRATROPIUM BROMIDE AND ALBUTEROL SULFATE 3 ML: .5; 3 SOLUTION RESPIRATORY (INHALATION) at 07:01

## 2024-01-19 RX ADMIN — IPRATROPIUM BROMIDE AND ALBUTEROL SULFATE 3 ML: .5; 3 SOLUTION RESPIRATORY (INHALATION) at 01:01

## 2024-01-19 RX ADMIN — GUAIFENESIN 1200 MG: 600 TABLET, EXTENDED RELEASE ORAL at 09:01

## 2024-01-19 RX ADMIN — FLUTICASONE PROPIONATE 100 MCG: 50 SPRAY, METERED NASAL at 10:01

## 2024-01-19 RX ADMIN — AMOXICILLIN AND CLAVULANATE POTASSIUM 1 TABLET: 875; 125 TABLET, FILM COATED ORAL at 09:01

## 2024-01-19 RX ADMIN — DOXYCYCLINE HYCLATE 100 MG: 100 TABLET, COATED ORAL at 09:01

## 2024-01-19 NOTE — PLAN OF CARE
"Discussed poc with pt, pt verbalized understanding    Purposeful rounding every 2hours    VS wnl  Fall precautions in place, remains injury free  Pt denies c/o pain  Pain and nausea under control with PRN meds    Abx given as prescribed  Bed locked at lowest position  Call light within reach    Chart check complete  Will cont with POC    Wound on right buttocks clean and drained per pt request. Packing removed and noticed bright green drainage. Packing reapplied along with gauze dressing. Pt called about an hr later saying that the dressing fell off when he ambulated to the bathroom. Dressing reapplied.    Pt with questions and concerns of possibly going home. States "I don't want to still have pneumonia and be discharged" explained to pt about when it is time for d/c he will have a plan with possible oral abx and an appt to follow up with primary care.     "

## 2024-01-19 NOTE — PROGRESS NOTES
Therapy with vancomycin complete and/or consult discontinued by provider.      Pharmacy will sign off, please re-consult as needed.    Kathe Molina [facility] 1/19/2024 7:42 AM

## 2024-01-19 NOTE — DISCHARGE SUMMARY
[facility] Medicine  Discharge Summary      Patient Name: Dallas Becker  MRN: [id number]  MARCIANO: [id number]  Patient Class: IP- Inpatient  Admission Date: 1/15/2024  Hospital Length of Stay: 3 days  Discharge Date and Time: 1/19/2024  1:53 PM  Attending Physician: Lalita att. providers found   Discharging Provider: Magalys Mcgraw MD  Primary Care Provider: Lalita Primary Doctor    Primary Care Team: Networked reference to record PCT     HPI:   Dallas Becker is a 68 y.o. male with a PMH  has a past medical history of AAA (abdominal aortic aneurysm). who presented as a transfer from [facility] for higher level of care for treatment of fever and pneumonia. Patient initially seen in ED day prior and underwent I&D of buttocks abscess but presented back to the ED earlier today after developing acute onset fever with T-max measuring 101.0 at home. Patient reported no known alleviating or aggravating factors noted with associated symptoms including dyspnea, yellow/green productive cough, and chest pain with inspiration which progressively worsened over the past few days.  Patient reported having no significant past medical history and denied exposure to ill contacts, recent hospitalizations, recent travel, and denies use of home O2, nebulizers, CPAP, or underlying lung disease.  Initial workup in the ED revealed patient met SIRS criteria in setting of underlying pneumonia and recent buttocks abscess and was admitted to Hospital Medicine under observation for continued medical management.    PCP: Lalita, Primary Doctor      * No surgery found *      Hospital Course:   Continued on IV Abx for PNA and buttock abscess. Wound care consulted and following. CT Pelvis 01/17 showed no residual drainable abscess. PT had slow improvement in leukocytosis, was transitioned from IV Vanc + Ceftriaxone to PO Doxycycline + Augmentin for coverage of CAP and buttock abscess prior to discharge.     Pt seen and examined on day of  discharge. He reports improvement in cough, was able to be weaned off supplemental oxygen. Buttock wound appears smaller in size, no residual induration noted, scant amount of drainage.Pt appears stable for discharge home today per my exam.     Followup with PCP scheduled on 01/22/24. Ambulatory referral sent to Vascular Surgery to establish care for management of AAA. [facility] at home referral.       Goals of Care Treatment Preferences:  Code Status: Full Code      Consults:   Consults (From admission, onward)          Status Ordering Provider     Inpatient consult to Social Work  Once        Provider:  (Not yet assigned)    Completed JAMIE GARCIA     Inpatient consult to Social Work  Once        Provider:  (Not yet assigned)    Completed JAMIE GARCIA     Inpatient consult to Social Work  Once        Provider:  (Not yet assigned)    Completed JAMIE GARCIA            Pulmonary  Pneumonia  - Treated with Levofloxacin to Ceftriaxone while inpatient   - Sputum cx pending   - transitioned to PO Augmentin + Doxycycline on discharge  - Weaned off supplemental O2   - Continue Guaifenesin for cough       Cardiac/Vascular  AAA (abdominal aortic aneurysm)  - POA, noted on CT pelvis, 5.3 cm diameter   - pt reports known Hx of AAA, was prev seen by Vascular Dr. Carlos Enrique Zavala in [place] but was lost to followup due to insurance issues  -amb referral placed to vascular surgery to establish care      Renal/  Hyponatremia  Mild, pt asymptomatic  ?SIADH in setting of PNA   Solute intake encouraged  Followup with PCP for repeat labs     ID  Cellulitis and abscess of buttock  S/p I&D x 2 in the ED   CT Pelvis 01/17 showed no residual drainable abscess   Treated with IV Vancomycin inpatient, transitioned to PO Doxy + Augmentin on discharge  Continue local wound care  Followup with PCP         Final Active Diagnoses:    Diagnosis Date Noted POA    Pneumonia [J18.9] 01/16/2024 Yes    Cellulitis and abscess of  buttock [L02.31, L03.317] 01/16/2024 Yes    Sepsis [A41.9] 01/16/2024 Yes    AAA (abdominal aortic aneurysm) [I71.40] 01/18/2024 Yes     Chronic    Dyspnea [R06.00] 01/16/2024 Yes    Hyponatremia [E87.1] 01/19/2024 Yes      Problems Resolved During this Admission:    Diagnosis Date Noted Date Resolved POA    PRINCIPAL PROBLEM:  Fever [R50.9] 01/16/2024 01/16/2024 Yes       Discharged Condition: good    Disposition: Home or Self Care    Follow Up:   Follow-up Information       [facility] Vascular Surgery. Schedule an appointment as soon as possible for a visit.    Specialty: Vascular Surgery  Why: followup AAA (referral sent)  Contact information:  [id number] [place] [id number]  Additional information:  4th Floor             Ammon Christina MD Follow up on 1/22/2024.    Specialties: Internal Medicine, Family Medicine  Why: followup as scheduled on 1/22/2024 7:00 AM  Contact information:  [address] [address] [address] [phone]                           Patient Instructions:      Ambulatory referral/consult to Internal Medicine   Standing Status: Future   Referral Priority: Routine Referral Type: Consultation   Referral Reason: Specialty Services Required   Requested Specialty: Internal Medicine   Number of Visits Requested: 1     Ambulatory referral/consult to Vascular Surgery   Standing Status: Future   Referral Priority: Routine Referral Type: Consultation   Referral Reason: Specialty Services Required   Requested Specialty: Vascular Surgery   Number of Visits Requested: 1     Ambulatory referral/consult to [facility]   Standing Status: Future   Referral Priority: Routine Referral Type: Consultation   Referral Reason: Specialty Services Required   Number of Visits Requested: 1     Diet Adult Regular     Notify your health care provider if you experience any of the following:  temperature >100.4     Notify your health care provider if you  "experience any of the following:  redness, tenderness, or signs of infection (pain, swelling, redness, odor or green/yellow discharge around incision site)     Notify your health care provider if you experience any of the following:  difficulty breathing or increased cough     Change dressing (specify)   Order Comments: Right medial buttock I&D site:  1. Cleanse with saline or vashe  2. Pat dry  3. Fill with 1/4" plain gauze packing strip  4. Cover with dry gauze  5. Secure with medipore tape  6. Change daily and prn excess drainage     Activity as tolerated       Significant Diagnostic Studies: See Hospital Course     Pending Diagnostic Studies:       None           Medications:  Reconciled Home Medications:      Medication List        START taking these medications      albuterol 90 mcg/actuation inhaler  Commonly known as: PROVENTIL/VENTOLIN HFA  Inhale 2 puffs into the lungs every 6 (six) hours as needed for Shortness of Breath.     amoxicillin-clavulanate 875-125mg 875-125 mg per tablet  Commonly known as: AUGMENTIN  Take 1 tablet by mouth every 12 (twelve) hours. for 5 days     doxycycline 100 MG tablet  Commonly known as: VIBRA-TABS  Take 1 tablet (100 mg total) by mouth every 12 (twelve) hours. for 6 days     MUCUS RELIEF  mg 12 hr tablet  Generic drug: guaiFENesin  Take 1 tablet (600 mg total) by mouth 2 (two) times daily. for 7 days     STOOL SOFTENER-LAXATIVE 8.6-50 mg per tablet  Generic drug: senna-docusate 8.6-50 mg  Take 1 tablet by mouth 2 (two) times daily as needed for Constipation.              Indwelling Lines/Drains at time of discharge:   Lines/Drains/Airways       None                   Time spent on the discharge of patient: 43 minutes         Magalys Mcgraw MD  Department of Hospital Medicine  [facility] - Med Surg  "

## 2024-01-19 NOTE — ASSESSMENT & PLAN NOTE
Mild, pt asymptomatic  ?SIADH in setting of PNA   Solute intake encouraged  Followup with PCP for repeat labs

## 2024-01-19 NOTE — CONSULTS
Pharmacokinetic Assessment Follow Up: IV Vancomycin    Vancomycin serum concentration assessment(s):    The trough level was drawn correctly and can be used to guide therapy at this time. The measurement is within the desired definitive target range of 15 to 20 mcg/mL.    Trough was collected prior to the 3rd new dose. Concern for accumulation with continued doses due to patient's age and regimen not yet fully at steady state.     Vancomycin Regimen Plan:    Change regimen to Vancomycin 1250 mg IV every 12 hours with next serum trough concentration measured at 0830 prior to 4th new dose on 1/20/24.    Drug levels (last 3 results):  Recent Labs   Lab Result Units 01/17/24 2018 01/18/24  2019   Vancomycin-Trough ug/mL 5.6* 17.1     Pharmacy will continue to follow and monitor vancomycin.    Please contact pharmacy at extension [phone] for questions regarding this assessment.    Thank you for the consult,   Barb Berman PharmD     Patient brief summary:  Dallas Becker is a 68 y.o. male initiated on antimicrobial therapy with IV Vancomycin for treatment of  lower respiratory infection: PNA; Cellulitis and abscess of buttock s/p I&D    The patient's current regimen is Vancomycin 1500 mg IV every 12 hours.    Drug Allergies:   Review of patient's allergies indicates:  No Known Allergies    Actual Body Weight: 79.5 kg    Renal Function:   Estimated Creatinine Clearance: 88.3 mL/min (based on SCr of 0.9 mg/dL).,     Dialysis Method (if applicable): N/A    CBC (last 72 hours):  Recent Labs   Lab Result Units 01/16/24  0611 01/17/24  0444 01/18/24  0644   WBC K/uL 23.36* 24.22* 19.22*   Hemoglobin g/dL 12.2* 11.7* 10.7*   Hematocrit % 36.0* 34.9* 32.2*   Platelets K/uL 339 394 414   Gran % % 86.8* 88.8* 85.0*   Lymph % % 5.4* 4.0* 6.9*   Mono % % 6.4 6.0 6.6   Eosinophil % % 0.4 0.2 0.4   Basophil % % 0.4 0.3 0.4   Differential Method  Automated Automated Automated       Metabolic Panel (last 72 hours):  Recent Labs    Lab Result Units 01/16/24  0611 01/17/24  0444 01/18/24  0644   Sodium mmol/L 134* 134* 133*   Potassium mmol/L 4.3 3.9 4.0   Chloride mmol/L 100 101 98   CO2 mmol/L 22* 23 23   Glucose mg/dL 102 120* 120*   BUN mg/dL 10 11 12   Creatinine mg/dL 0.9 0.8 0.9   Albumin g/dL 2.6* 2.3* 2.2*   Total Bilirubin mg/dL 1.1* 0.8 0.8   Alkaline Phosphatase U/L 82 98 106   AST U/L 12 13 19   ALT U/L 10 10 16       Vancomycin Administrations:  vancomycin given in the last 96 hours                     vancomycin 1,500 mg in dextrose 5 % (D5W) 250 mL IVPB (Vial-Mate) (mg) 1,500 mg New Bag 01/18/24 0904     1,500 mg New Bag 01/17/24 2209    vancomycin 2 g in dextrose 5 % 500 mL IVPB (mg) 2,000 mg New Bag 01/16/24 2054    vancomycin (VANCOCIN) 1,000 mg in dextrose 5 % (D5W) 250 mL IVPB (Vial-Mate) (mg) 1,000 mg New Bag 01/15/24 1813    vancomycin (VANCOCIN) 1,000 mg in dextrose 5 % (D5W) 250 mL IVPB (Vial-Mate) (mg) 1,000 mg New Bag 01/15/24 1454                    Microbiologic Results:  Microbiology Results (last 7 days)       Procedure Component Value Units Date/Time    Blood culture x two cultures. Draw prior to antibiotics. [[id number]] Collected: 01/15/24 1312    Order Status: Completed Specimen: Blood from Peripheral, Hand, Left Updated: 01/18/24 0612     Blood Culture, Routine No Growth to date      No Growth to date      No Growth to date    Narrative:      Aerobic and anaerobic    Blood culture x two cultures. Draw prior to antibiotics. [[id number]] Collected: 01/15/24 1305    Order Status: Completed Specimen: Blood from Peripheral, Antecubital, Left Updated: 01/18/24 0612     Blood Culture, Routine No Growth to date      No Growth to date      No Growth to date    Narrative:      Aerobic and anaerobic    Culture, Respiratory with Gram Stain [[id number]] Collected: 01/16/24 1437    Order Status: Sent Specimen: Respiratory from Sputum, Expectorated Updated: 01/16/24 0842          Thank you for allowing us to participate  in this patient's care.     Barb Berman PharmD 01/18/2024 9:01 PM

## 2024-01-19 NOTE — RESPIRATORY THERAPY
Home Oxygen Evaluation - [facility] - Cardiopulmonary Department      Date Performed: 1/19/2024      1) Patient's Home O2 Sat on room air, while at rest: Room Air SpO2 At Rest: 94 %        If O2 sats on room air at rest are 88% or below, patient qualifies.  Document O2 liter flow needed in Step 2.  If O2 sats are 89% or above, complete Step 3.        2)  If patient is not ambulated and O2 sats are <88%, what is the O2 liter flow required to meet ordered saturation?      If O2 sats on room air while exercising remain 89% or above patient does not qualify, no further testing needed Document N/A in step 3. If O2 sats on room air while exercising are 88% or below, continue to Step 4.    3) Patient's O2 Sat on room air while exercising: Room Air SpO2 During Ambulation: 91 %        4) Patient's O2 Sat while exercising on O2:   at Ambulation O2 LPM: 0 LPM         (Must show improvement from #4 for patients to qualify)

## 2024-01-19 NOTE — PLAN OF CARE
[facility] Med [facility]  Discharge Final Note    Primary Care Provider: No, Primary Doctor    Expected Discharge Date: 1/19/2024    Final Discharge Note (most recent)       Final Note - 01/19/24 1034          Final Note    Assessment Type Final Discharge Note     Anticipated Discharge Disposition Home or Self Care     Hospital Resources/Appts/Education Provided Appointments scheduled and added to AVS        Post-Acute Status    Discharge Delays None known at this time                   Contact Info       [facility]   Specialty: Vascular Surgery    [address]  [place] [address]       Next Steps: Schedule an appointment as soon as possible for a visit    Instructions: followup AAA (referral sent)    Ammon Christina MD   Specialty: Internal Medicine, Family Medicine    [address] [address] [address]   Phone: [phone]       Next Steps: Follow up on 1/22/2024    Instructions: followup as scheduled on 1/22/2024 7:00 AM          No d/c needs/orders at this time.     [facility] Escalation Team to help arrange new PCP appt with an [facility] PCP prior to d/c.     [address] notified by Deneen Marte with [facility] Escalation Team that PCP appt has been arranged with Dr. Amaro at [facility] for 1/26/24 at 11am.     1117 PCP appt cancelled with [facility]. Pt scheduled with Dr. Christina at [facility] on 1/22/24.

## 2024-01-19 NOTE — ASSESSMENT & PLAN NOTE
S/p I&D x 2 in the ED   CT Pelvis 01/17 showed no residual drainable abscess   Treated with IV Vancomycin inpatient, transitioned to PO Doxy + Augmentin on discharge  Continue local wound care  Followup with PCP

## 2024-01-19 NOTE — ASSESSMENT & PLAN NOTE
- POA, noted on CT pelvis, 5.3 cm diameter   - pt reports known Hx of AAA, was prev seen by Vascular Dr. Carlos Enrique Zavala in [place] but was lost to followup due to insurance issues  -amb referral placed to vascular surgery to establish care

## 2024-01-19 NOTE — ASSESSMENT & PLAN NOTE
- Treated with Levofloxacin to Ceftriaxone while inpatient   - Sputum cx pending   - transitioned to PO Augmentin + Doxycycline on discharge  - Weaned off supplemental O2   - Continue Guaifenesin for cough

## 2024-01-21 LAB
BACTERIA BLD CULT: NORMAL
BACTERIA BLD CULT: NORMAL

## 2024-01-22 ENCOUNTER — LAB VISIT (OUTPATIENT)
Dept: LAB | Facility: HOSPITAL | Age: 69
End: 2024-01-22
Attending: STUDENT IN AN ORGANIZED HEALTH CARE EDUCATION/TRAINING PROGRAM
Payer: MEDICARE

## 2024-01-22 ENCOUNTER — OFFICE VISIT (OUTPATIENT)
Dept: INTERNAL MEDICINE | Facility: CLINIC | Age: 69
End: 2024-01-22
Payer: MEDICARE

## 2024-01-22 DIAGNOSIS — A41.9 SEPSIS WITHOUT ACUTE ORGAN DYSFUNCTION, DUE TO UNSPECIFIED ORGANISM: ICD-10-CM

## 2024-01-22 DIAGNOSIS — E87.1 HYPONATREMIA: ICD-10-CM

## 2024-01-22 DIAGNOSIS — A41.9 SEPSIS WITHOUT ACUTE ORGAN DYSFUNCTION, DUE TO UNSPECIFIED ORGANISM: Primary | ICD-10-CM

## 2024-01-22 LAB
ALBUMIN SERPL BCP-MCNC: 2.2 G/DL (ref 3.5–5.2)
ALP SERPL-CCNC: 105 U/L (ref 55–135)
ALT SERPL W/O P-5'-P-CCNC: 17 U/L (ref 10–44)
ANION GAP SERPL CALC-SCNC: 8 MMOL/L (ref 8–16)
AST SERPL-CCNC: 21 U/L (ref 10–40)
BASOPHILS # BLD AUTO: 0.07 K/UL (ref 0–0.2)
BASOPHILS NFR BLD: 0.3 % (ref 0–1.9)
BILIRUB SERPL-MCNC: 0.5 MG/DL (ref 0.1–1)
BUN SERPL-MCNC: 19 MG/DL (ref 8–23)
CALCIUM SERPL-MCNC: 9.3 MG/DL (ref 8.7–10.5)
CHLORIDE SERPL-SCNC: 100 MMOL/L (ref 95–110)
CO2 SERPL-SCNC: 24 MMOL/L (ref 23–29)
CREAT SERPL-MCNC: 0.9 MG/DL (ref 0.5–1.4)
DIFFERENTIAL METHOD BLD: ABNORMAL
EOSINOPHIL # BLD AUTO: 0.1 K/UL (ref 0–0.5)
EOSINOPHIL NFR BLD: 0.4 % (ref 0–8)
ERYTHROCYTE [DISTWIDTH] IN BLOOD BY AUTOMATED COUNT: 13.9 % (ref 11.5–14.5)
EST. GFR  (NO RACE VARIABLE): >60 ML/MIN/1.73 M^2
GLUCOSE SERPL-MCNC: 129 MG/DL (ref 70–110)
HCT VFR BLD AUTO: 34.2 % (ref 40–54)
HGB BLD-MCNC: 11.5 G/DL (ref 14–18)
IMM GRANULOCYTES # BLD AUTO: 0.19 K/UL (ref 0–0.04)
IMM GRANULOCYTES NFR BLD AUTO: 0.9 % (ref 0–0.5)
LYMPHOCYTES # BLD AUTO: 1.4 K/UL (ref 1–4.8)
LYMPHOCYTES NFR BLD: 6.7 % (ref 18–48)
MCH RBC QN AUTO: 30 PG (ref 27–31)
MCHC RBC AUTO-ENTMCNC: 33.6 G/DL (ref 32–36)
MCV RBC AUTO: 89 FL (ref 82–98)
MONOCYTES # BLD AUTO: 1.5 K/UL (ref 0.3–1)
MONOCYTES NFR BLD: 7.3 % (ref 4–15)
NEUTROPHILS # BLD AUTO: 17 K/UL (ref 1.8–7.7)
NEUTROPHILS NFR BLD: 84.4 % (ref 38–73)
NRBC BLD-RTO: 0 /100 WBC
PLATELET # BLD AUTO: 498 K/UL (ref 150–450)
PMV BLD AUTO: 10.7 FL (ref 9.2–12.9)
POTASSIUM SERPL-SCNC: 4.8 MMOL/L (ref 3.5–5.1)
PROT SERPL-MCNC: 8.4 G/DL (ref 6–8.4)
RBC # BLD AUTO: 3.83 M/UL (ref 4.6–6.2)
SODIUM SERPL-SCNC: 132 MMOL/L (ref 136–145)
WBC # BLD AUTO: 20.15 K/UL (ref 3.9–12.7)

## 2024-01-22 PROCEDURE — 99999 PR PBB SHADOW E&M-EST. PATIENT-LVL I: CPT | Mod: PBBFAC,,, | Performed by: STUDENT IN AN ORGANIZED HEALTH CARE EDUCATION/TRAINING PROGRAM

## 2024-01-22 PROCEDURE — 99211 OFF/OP EST MAY X REQ PHY/QHP: CPT | Mod: PBBFAC,PO | Performed by: STUDENT IN AN ORGANIZED HEALTH CARE EDUCATION/TRAINING PROGRAM

## 2024-01-22 PROCEDURE — 80053 COMPREHEN METABOLIC PANEL: CPT | Mod: PO | Performed by: STUDENT IN AN ORGANIZED HEALTH CARE EDUCATION/TRAINING PROGRAM

## 2024-01-22 PROCEDURE — 85025 COMPLETE CBC W/AUTO DIFF WBC: CPT | Mod: PO | Performed by: STUDENT IN AN ORGANIZED HEALTH CARE EDUCATION/TRAINING PROGRAM

## 2024-01-22 PROCEDURE — 36415 COLL VENOUS BLD VENIPUNCTURE: CPT | Mod: PO | Performed by: STUDENT IN AN ORGANIZED HEALTH CARE EDUCATION/TRAINING PROGRAM

## 2024-01-22 PROCEDURE — 99495 TRANSJ CARE MGMT MOD F2F 14D: CPT | Mod: S$PBB,,, | Performed by: STUDENT IN AN ORGANIZED HEALTH CARE EDUCATION/TRAINING PROGRAM

## 2024-01-22 RX ORDER — PROMETHAZINE HYDROCHLORIDE AND DEXTROMETHORPHAN HYDROBROMIDE 6.25; 15 MG/5ML; MG/5ML
5 SYRUP ORAL 4 TIMES DAILY
Qty: 200 ML | Refills: 0 | Status: SHIPPED | OUTPATIENT
Start: 2024-01-22 | End: 2024-01-22 | Stop reason: CLARIF

## 2024-01-22 RX ORDER — BENZONATATE 100 MG/1
100 CAPSULE ORAL 3 TIMES DAILY PRN
Qty: 30 CAPSULE | Refills: 0 | Status: SHIPPED | OUTPATIENT
Start: 2024-01-22 | End: 2024-01-22 | Stop reason: CLARIF

## 2024-01-22 NOTE — PROGRESS NOTES
Discharge Information   Dallas Becker, 68 y.o./White/male   Admitted for management of sepsis between 1/15/24 and 1/19/24.     Discharge Date:  1/19/2024        Dallas Becker is a 68 y.o. male with a PMH  has a past medical history of AAA (abdominal aortic aneurysm). who presented as a transfer from [facility] for higher level of care for treatment of fever and pneumonia. Patient initially seen in ED day prior and underwent I&D of buttocks abscess but presented back to the ED earlier today after developing acute onset fever with T-max measuring 101.0 at home. Patient reported no known alleviating or aggravating factors noted with associated symptoms including dyspnea, yellow/green productive cough, and chest pain with inspiration which progressively worsened over the past few days.  Patient reported having no significant past medical history and denied exposure to ill contacts, recent hospitalizations, recent travel, and denies use of home O2, nebulizers, CPAP, or underlying lung disease.  Initial workup in the ED revealed patient met SIRS criteria in setting of underlying pneumonia and recent buttocks abscess and was admitted to Hospital Medicine under observation for continued medical management.     PCP: No, Primary Doctor        * No surgery found *       Hospital Course:   Continued on IV Abx for PNA and buttock abscess. Wound care consulted and following. CT Pelvis 01/17 showed no residual drainable abscess. PT had slow improvement in leukocytosis, was transitioned from IV Vanc + Ceftriaxone to PO Doxycycline + Augmentin for coverage of CAP and buttock abscess prior to discharge.      Pt seen and examined on day of discharge. He reports improvement in cough, was able to be weaned off supplemental oxygen. Buttock wound appears smaller in size, no residual induration noted, scant amount of drainage.Pt appears stable for discharge home today per my exam.      Followup with PCP scheduled on 01/22/24.  Ambulatory referral sent to Vascular Surgery to establish care for management of AAA. [facility] referral.        Primary Discharge Diagnosis:      Diagnosis Date Noted POA    Pneumonia [J18.9] 01/16/2024 Yes    Cellulitis and abscess of buttock [L02.31, L03.317] 01/16/2024 Yes    Sepsis [A41.9] 01/16/2024 Yes    AAA (abdominal aortic aneurysm) [I71.40] 01/18/2024 Yes       Chronic    Dyspnea [R06.00] 01/16/2024 Yes    Hyponatremia [E87.1] 01/19/2024 Yes       Problems Resolved During this Admission:     Diagnosis Date Noted Date Resolved POA    PRINCIPAL PROBLEM:  Fever [R50.9] 01/16/2024 01/16/2024 Yes             How patient is feeling since discharge from the hospital?  He reports to be doing better. He is still changing dressing in his gluteal region. He denies having fever, does report to have soreness in his gluteal region which is getting better. He reports that he was seeing primary care doctor in [facility] and now has come down here in [place]. He is still taking antibiotics. Doing well no concerns. He is still taking antibiotics without any issues. Lab work reviewed from hospital stay showing hyponatremia and leucocytosis which was getting better. I will repeat lab today.    Review of Systems -   Negative except mentioned above    PCP: No, Primary Doctor  Prior to Admission medications    Medication Sig Start Date End Date Taking? Authorizing Provider   albuterol (PROVENTIL/VENTOLIN HFA) 90 mcg/actuation inhaler Inhale 2 puffs into the lungs every 6 (six) hours as needed for Shortness of Breath. 1/19/24 1/18/25 Yes Magalys Mcgraw MD   amoxicillin-clavulanate 875-125mg (AUGMENTIN) 875-125 mg per tablet Take 1 tablet by mouth every 12 (twelve) hours. for 5 days 1/19/24 1/24/24 Yes Magalys Mcgraw MD   doxycycline (VIBRA-TABS) 100 MG tablet Take 1 tablet (100 mg total) by mouth every 12 (twelve) hours. for 6 days 1/19/24 1/25/24 Yes Magalys Mcgraw MD    guaiFENesin (MUCINEX) 600 mg 12 hr tablet Take 1 tablet (600 mg total) by mouth 2 (two) times daily. for 7 days 1/19/24 1/26/24 Yes Magalys Mcgraw MD   senna-docusate 8.6-50 mg (PERICOLACE) 8.6-50 mg per tablet Take 1 tablet by mouth 2 (two) times daily as needed for Constipation. 1/19/24 1/26/24 Yes Magalys Mcgraw MD   benzonatate (TESSALON) 100 MG capsule Take 1 capsule (100 mg total) by mouth 3 (three) times daily as needed for Cough. 1/22/24 1/22/24  Ammon Christina MD   promethazine-dextromethorphan (PROMETHAZINE-DM) 6.25-15 mg/5 mL Syrp Take 5 mLs by mouth 4 (four) times daily. for 10 days 1/22/24 1/22/24  Ammon Christina MD       There were no vitals filed for this visit.   There is no height or weight on file to calculate BMI.     PE:  General: Not in acute distress  HEENT : Atraumatic, EOM normal, no eye or nasal congestion, no ear discharge, no lymphadenopathy  Heart:  RRR. Normal S1 and S2. No S3/S4, no murmur / gallop / rub.   Respiratory: Effort normal. Clear to auscultation bilaterally. No rales/ rhonchi/ wheezing, no use of accessory muscles for breathing.  Abdomen: Positive bowel sounds. Abdomen is soft, nondistended, nontender, no guarding, no rebound, no masses / organomegaly.  Extremities: No edema or erythema noted,  2+ pulse in all extremities.  Neurologic: Oriented to time, place and person, Grossly intact.      Plan:  1. Sepsis without acute organ dysfunction, due to unspecified organism  - CBC W/ AUTO DIFFERENTIAL; Future    2. Hyponatremia  - COMPREHENSIVE METABOLIC PANEL; Future    1. Sepsis without acute organ dysfunction, due to unspecified organism  - was on Levaquin and ceftriaxone initially, now on doxycycline and Augmentin which needs to be continued  Repeat lab to ensure leucocytosis is resolving  - CBC W/ AUTO DIFFERENTIAL; Future    2. Hyponatremia  - patient asymptomatic  - COMPREHENSIVE METABOLIC PANEL; Future      Medication & Order Review     Discharge Medication  "Review:    Medication reconciliation performed Yes   If no, state reason why not performed: N/A       Did patient have any difficulty/problems filling prescriptions?  No           If yes, state reason and steps taken to assist in resolving issue: N/A     Does patient have any questions regarding medications?  No           If yes, state question and steps taken to answer question:  N/A      Was Home Health and/or any equipment ordered for patient upon discharge?  No          Transitional Care Note    Family and/or Caretaker present at visit?  No.  Diagnostic tests reviewed/disposition: I have reviewed all completed as well as pending diagnostic tests at the time of discharge.  Disease/illness education: Yes  Home health/community services discussion/referrals: Patient does not have home health established from hospital visit.  They do not need home health.  If needed, we will set up home health for the patient.   Establishment or re-establishment of referral orders for community resources: No other necessary community resources.   Discussion with other health care providers: No discussion with other health care providers necessary.          Red Flag Review     Was patient educated on "red flags" or things to watch for?  Yes       If yes:  "Red flags" patient was told to watch for:     Severe headache/ lightheadedness               SOB               Chest pain                Is patient experiencing any red flags today (or any of these symptoms) (List examples of red flags specifically)?  No        Ammon Christina MD    Rtc in 2 weeks for establishing care.                "

## 2024-01-25 ENCOUNTER — OFFICE VISIT (OUTPATIENT)
Dept: HOME HEALTH SERVICES | Facility: CLINIC | Age: 69
End: 2024-01-25
Payer: MEDICARE

## 2024-01-25 VITALS
OXYGEN SATURATION: 96 % | SYSTOLIC BLOOD PRESSURE: 110 MMHG | RESPIRATION RATE: 18 BRPM | TEMPERATURE: 98 F | DIASTOLIC BLOOD PRESSURE: 70 MMHG | HEART RATE: 73 BPM

## 2024-01-25 DIAGNOSIS — I71.43 INFRARENAL ABDOMINAL AORTIC ANEURYSM (AAA) WITHOUT RUPTURE: Chronic | ICD-10-CM

## 2024-01-25 DIAGNOSIS — L02.31 CELLULITIS AND ABSCESS OF BUTTOCK: ICD-10-CM

## 2024-01-25 DIAGNOSIS — J18.9 PNEUMONIA OF LEFT UPPER LOBE DUE TO INFECTIOUS ORGANISM: ICD-10-CM

## 2024-01-25 DIAGNOSIS — L03.317 CELLULITIS AND ABSCESS OF BUTTOCK: ICD-10-CM

## 2024-01-25 PROCEDURE — 99349 HOME/RES VST EST MOD MDM 40: CPT | Mod: S$GLB,,, | Performed by: NURSE PRACTITIONER

## 2024-01-25 NOTE — PROGRESS NOTES
[facility]  Transitional Care Management (TCM) Home Visit    Encounter Provider: Disha Oconnor   PCP: Lalita, Primary Doctor  Consult Requested By: Dr. Magalys Mcgraw  Admit Date: 1/15/24   IP Discharge Date: 1/19/24  Hospital Length of Stay:RRHLOS@ days  Days since discharge (from IP or SNF): 6 days   [facility] Contact Note: 01/23/2024  Hospital Diagnosis: Pneumonia of left upper lobe due to infectious organism [J18.9];Infrarenal abdominal aortic aneurysm (AAA) without rupture [I71.43];Cellulitis and abscess of buttock [L02.31, L03.317]     HISTORY OF PRESENT ILLNESS      Patient ID: Dallas Becker is a 68 y.o. male was recently admitted to the hospital, this is their TCM encounter.    Hospital Course Synopsis:    1) 68 y.o. male presents with fever in the setting very recent abscess incision and drainage as described.  Elevated risk factors for more serious illness led to additional investigation revealing left-sided pneumonia with hypoxemia and leukocytosis.  Considering age and other factors, his safety profile risk is strongly in favor of at least 1 night of IV antibiotics and observation with repeat labs in the morning.  He is initially very reluctant to agree to this recommendation but ultimately has done so.  We have consulted Hospital Medicine and are beginning appropriate antibiotics and monitoring   2) Developments since hospitalization and current needs: no new issues  3) Please confirm dates of admit, discharge, LOS above are correct: verified with patient and discharge paperwork    DECISION MAKING TODAY       Assessment & Plan:  1. Pneumonia of left upper lobe due to infectious organism  -     Ambulatory referral/consult to [facility]    2. Infrarenal abdominal aortic aneurysm (AAA) without rupture  Comments:  follow up with vascular surgeon as instructed  Orders:  -     Ambulatory referral/consult to [facility]    3.  Cellulitis and abscess of buttock  Comments:  area is healed  Orders:  -     Ambulatory referral/consult to [facility]         Medication List on Discharge:     Medication List            Accurate as of January 25, 2024  3:51 PM. If you have any questions, ask your nurse or doctor.                CONTINUE taking these medications      albuterol 90 mcg/actuation inhaler  Commonly known as: PROVENTIL/VENTOLIN HFA  Inhale 2 puffs into the lungs every 6 (six) hours as needed for Shortness of Breath.     doxycycline 100 MG tablet  Commonly known as: VIBRA-TABS  Take 1 tablet (100 mg total) by mouth every 12 (twelve) hours. for 6 days     MUCUS RELIEF  mg 12 hr tablet  Generic drug: guaiFENesin  Take 1 tablet (600 mg total) by mouth 2 (two) times daily. for 7 days     STOOL SOFTENER-LAXATIVE 8.6-50 mg per tablet  Generic drug: senna-docusate 8.6-50 mg  Take 1 tablet by mouth 2 (two) times daily as needed for Constipation.              Medication Reconciliation:  Were medications changed on discharge? Yes  Were medications in the home? Ye sand patient has completed his antibiotics  Is the patient taking the medications as directed? Yes  Does the patient understand the medications and changes? Yes  Does updated med list accurately reflects meds patient is currently taking? Yes    ENVIRONMENT OF CARE      Family and/or Caregiver present at visit?  Yes  Name of Caregiver: Rohan  History provided by: patient    Advance Care Planning   Advanced Care Planning Status:  Patient has had an ACP conversation  Living Will: No  Power of : No  [place]: No    Does Caregiver have HCPoA: No  Changes today: None  Is patient hospice appropriate: No  (If needed, use PPS <30 or FAST score >7)  Was referral to hospice placed: No       Impression upon entering the home:  Physical Dwelling: trailer   Appearance of home environment: cleaniness: clean, walking pathways: clear, lighting: adequate, and  home structure: sound structure  Functional Status: independent  Mobility: ambulatory  Nutritional access: adequate intake and access  Home Health: No, and does not need it at this time   DME/Supplies: none     Diagnostic tests reviewed/disposition: No diagnosic tests pending after this hospitalization.  Disease/illness education:  Pneumonia, hponatremia  Establishment or re-establishment of referral orders for community resources: No other necessary community resources.   Discussion with other health care providers: No discussion with other health care providers necessary.   Does patient have a PCP at [place]? No   Repatriation plan with PCP? follow-up with PCP within 90d   Does patient have an ostomy (ileostomy, colostomy, suprapubic catheter, nephrostomy tube, tracheostomy, PEG tube, pleurex catheter, cholecystostomy, etc)? No  Were BPAs reviewed? Yes    Social History     Socioeconomic History    Marital status: Single     Social Determinants of Health     Financial Resource Strain: Low Risk  (1/17/2024)    Overall Financial Resource Strain (CARDIA)     Difficulty of Paying Living Expenses: Not very hard   Food Insecurity: No Food Insecurity (1/17/2024)    Hunger Vital Sign     Worried About Running Out of Food in the Last Year: Never true     Ran Out of Food in the Last Year: Never true   Transportation Needs: No Transportation Needs (1/17/2024)    PRAPARE - Transportation     Lack of Transportation (Medical): No     Lack of Transportation (Non-Medical): No   Stress: Stress Concern Present (1/17/2024)    [employer] [place] [employer] - Occupational Stress Questionnaire     Feeling of Stress : To some extent   Housing Stability: Low Risk  (1/17/2024)    Housing Stability Vital Sign     Unable to Pay for Housing in the Last Year: No     Number of Places Lived in the Last Year: 1     Unstable Housing in the Last Year: No       OBJECTIVE:     Vital Signs:  Vitals:    01/25/24 1030   BP: 110/70   Pulse: 73    Resp: 18   Temp: 98 °F (36.7 °C)       Review of Systems   Constitutional: Negative.    HENT: Negative.     Respiratory:  Positive for cough (thick sputum). Negative for shortness of breath.    Cardiovascular: Negative.    Gastrointestinal: Negative.    Endocrine: Negative.    Genitourinary: Negative.    Musculoskeletal: Negative.    Skin: Negative.    Allergic/Immunologic: Negative.    Neurological: Negative.    Hematological: Negative.    Psychiatric/Behavioral: Negative.         Physical Exam:  Physical Exam  Vitals reviewed.   Constitutional:       Appearance: He is underweight.   HENT:      Head: Normocephalic and atraumatic.      Nose: Nose normal.      Mouth/Throat:      Mouth: Mucous membranes are moist.      Pharynx: Oropharynx is clear.   Cardiovascular:      Rate and Rhythm: Normal rate and regular rhythm.      Heart sounds: Normal heart sounds.   Pulmonary:      Effort: Pulmonary effort is normal.      Breath sounds: Normal breath sounds.   Abdominal:      General: Bowel sounds are normal. There is distension.      Palpations: Abdomen is soft.   Musculoskeletal:         General: Normal range of motion.      Cervical back: Neck supple.   Skin:     General: Skin is warm and dry.   Neurological:      Mental Status: He is alert and oriented to person, place, and time.   Psychiatric:         Mood and Affect: Mood normal.         Behavior: Behavior normal.         INSTRUCTIONS FOR PATIENT:     Scheduled Follow-up, Appts Reviewed with Modifications if Needed: Yes  Future Appointments   Date Time Provider Department Center   2/5/2024  7:00 AM [facility]       Encounter for Medical Follow-Up and Medication Review  - [facility] to schedule follow-up visit with patient in 4 weeks or PRN     Patient Instructions Given:  - Continue all medications, treatments and therapies as ordered.   - Follow all instructions, recommendations as discussed.  - Maintain Safety Precautions  at all times.  - Attend all medical appointments as scheduled.  - For worsening symptoms: call Primary Care Physician or Nurse Practitioner.  - For emergencies, call 911 or immediately report to the nearest emergency room        Signature: Disha Oconnor NP    Transition of Care Visit:  I have reviewed and updated the history and problem list.  I have reconciled the medication list.  I have discussed the hospitalization and current medical issues, prognosis and plans with the patient/family.

## 2024-02-05 ENCOUNTER — HOSPITAL ENCOUNTER (OUTPATIENT)
Dept: RADIOLOGY | Facility: HOSPITAL | Age: 69
Discharge: HOME | End: 2024-02-05
Attending: STUDENT IN AN ORGANIZED HEALTH CARE EDUCATION/TRAINING PROGRAM
Payer: MEDICARE

## 2024-02-05 ENCOUNTER — OFFICE VISIT (OUTPATIENT)
Dept: INTERNAL MEDICINE | Facility: CLINIC | Age: 69
End: 2024-02-05
Payer: MEDICARE

## 2024-02-05 VITALS
BODY MASS INDEX: 23.11 KG/M2 | WEIGHT: 174.38 LBS | OXYGEN SATURATION: 97 % | HEART RATE: 77 BPM | TEMPERATURE: 98 F | DIASTOLIC BLOOD PRESSURE: 72 MMHG | HEIGHT: 73 IN | SYSTOLIC BLOOD PRESSURE: 124 MMHG

## 2024-02-05 DIAGNOSIS — R79.9 ABNORMAL FINDING OF BLOOD CHEMISTRY, UNSPECIFIED: ICD-10-CM

## 2024-02-05 DIAGNOSIS — Z76.89 ENCOUNTER TO ESTABLISH CARE: Primary | ICD-10-CM

## 2024-02-05 DIAGNOSIS — F17.200 TOBACCO DEPENDENCE SYNDROME: ICD-10-CM

## 2024-02-05 DIAGNOSIS — H91.93 BILATERAL HEARING LOSS, UNSPECIFIED HEARING LOSS TYPE: ICD-10-CM

## 2024-02-05 DIAGNOSIS — I73.9 PAD (PERIPHERAL ARTERY DISEASE): ICD-10-CM

## 2024-02-05 DIAGNOSIS — M79.89 LEG SWELLING: ICD-10-CM

## 2024-02-05 DIAGNOSIS — I71.43 INFRARENAL ABDOMINAL AORTIC ANEURYSM (AAA) WITHOUT RUPTURE: ICD-10-CM

## 2024-02-05 DIAGNOSIS — J18.9 PNEUMONIA OF LEFT LOWER LOBE DUE TO INFECTIOUS ORGANISM: ICD-10-CM

## 2024-02-05 PROCEDURE — 71046 X-RAY EXAM CHEST 2 VIEWS: CPT | Mod: TC,PO

## 2024-02-05 PROCEDURE — 99214 OFFICE O/P EST MOD 30 MIN: CPT | Mod: S$PBB,,, | Performed by: STUDENT IN AN ORGANIZED HEALTH CARE EDUCATION/TRAINING PROGRAM

## 2024-02-05 PROCEDURE — 99999 PR PBB SHADOW E&M-EST. PATIENT-LVL V: CPT | Mod: PBBFAC,,, | Performed by: STUDENT IN AN ORGANIZED HEALTH CARE EDUCATION/TRAINING PROGRAM

## 2024-02-05 PROCEDURE — 99215 OFFICE O/P EST HI 40 MIN: CPT | Mod: PBBFAC,25,PO | Performed by: STUDENT IN AN ORGANIZED HEALTH CARE EDUCATION/TRAINING PROGRAM

## 2024-02-05 PROCEDURE — 71046 X-RAY EXAM CHEST 2 VIEWS: CPT | Mod: 26,,, | Performed by: RADIOLOGY

## 2024-02-05 RX ORDER — ASPIRIN 81 MG/1
81 TABLET ORAL DAILY
Qty: 360 TABLET | Refills: 0 | Status: SHIPPED | OUTPATIENT
Start: 2024-02-05 | End: 2025-02-04

## 2024-02-05 RX ORDER — ATORVASTATIN CALCIUM 80 MG/1
80 TABLET, FILM COATED ORAL DAILY
Qty: 180 TABLET | Refills: 0 | Status: SHIPPED | OUTPATIENT
Start: 2024-02-05 | End: 2024-02-05

## 2024-02-05 RX ORDER — BENZONATATE 100 MG/1
100 CAPSULE ORAL
COMMUNITY
Start: 2024-01-22

## 2024-02-05 RX ORDER — METHYLPREDNISOLONE 4 MG/1
TABLET ORAL
Qty: 21 EACH | Refills: 0 | Status: SHIPPED | OUTPATIENT
Start: 2024-02-05 | End: 2024-02-26

## 2024-02-05 RX ORDER — ATORVASTATIN CALCIUM 80 MG/1
80 TABLET, FILM COATED ORAL DAILY
Qty: 180 TABLET | Refills: 0 | Status: SHIPPED | OUTPATIENT
Start: 2024-02-05 | End: 2024-08-03

## 2024-02-05 RX ORDER — AMLODIPINE BESYLATE 10 MG/1
10 TABLET ORAL DAILY
Qty: 90 TABLET | Refills: 0 | Status: SHIPPED | OUTPATIENT
Start: 2024-02-05 | End: 2024-05-05

## 2024-02-05 RX ORDER — LISINOPRIL 10 MG/1
10 TABLET ORAL DAILY
Qty: 90 TABLET | Refills: 0 | Status: SHIPPED | OUTPATIENT
Start: 2024-02-05

## 2024-02-05 RX ORDER — FUROSEMIDE 20 MG/1
20 TABLET ORAL DAILY
Qty: 7 TABLET | Refills: 0 | Status: SHIPPED | OUTPATIENT
Start: 2024-02-05 | End: 2024-02-12

## 2024-02-05 RX ORDER — PROMETHAZINE HYDROCHLORIDE AND DEXTROMETHORPHAN HYDROBROMIDE 6.25; 15 MG/5ML; MG/5ML
5 SYRUP ORAL 4 TIMES DAILY
COMMUNITY
Start: 2024-01-22

## 2024-02-05 RX ORDER — AMLODIPINE BESYLATE 10 MG/1
10 TABLET ORAL DAILY
COMMUNITY
End: 2024-02-05 | Stop reason: SDUPTHER

## 2024-02-05 RX ORDER — LISINOPRIL 10 MG/1
10 TABLET ORAL DAILY
COMMUNITY
End: 2024-02-05 | Stop reason: SDUPTHER

## 2024-02-06 ENCOUNTER — PATIENT MESSAGE (OUTPATIENT)
Dept: ADMINISTRATIVE | Facility: HOSPITAL | Age: 69
End: 2024-02-06
Payer: MEDICARE

## 2024-02-06 ENCOUNTER — IMMUNIZATION (OUTPATIENT)
Dept: INTERNAL MEDICINE | Facility: CLINIC | Age: 69
End: 2024-02-06
Payer: MEDICARE

## 2024-02-06 DIAGNOSIS — Z23 NEED FOR VACCINATION: Primary | ICD-10-CM

## 2024-02-06 DIAGNOSIS — Z12.11 COLON CANCER SCREENING: ICD-10-CM

## 2024-02-06 PROCEDURE — G0008 ADMIN INFLUENZA VIRUS VAC: HCPCS | Mod: PBBFAC,PO

## 2024-02-06 PROCEDURE — 99999PBSHW FLU VACCINE - QUADRIVALENT - ADJUVANTED: Mod: PBBFAC,,,

## 2024-02-06 PROCEDURE — 90694 VACC AIIV4 NO PRSRV 0.5ML IM: CPT | Mod: PBBFAC,PO

## 2024-02-06 NOTE — PROGRESS NOTES
Chief Complaint   Patient presents with    Establish Care    Anxiety     HPI: Dallas Becker is a 68 y.o. male  with Pmhx listed below who presents to clinic for establishing care. He was previously followed by Dr. Tom Rodarte at [facility]. DARIN to be obtained from him today. I saw him following hospital discharge on 1/22/2024 after staying in [facility] for management of sepsis between 1/15/24 and 1/19/24. He is still concerned about his Pneumonia which was treated while he was there at hospital. He wants to know if its cleared.  With regards to his symptoms, he still has soreness in the base of his lungs on the left side along with cough.  He reports it to be getting better.  Denies having shortness of breath, wheezing, fever, chills however.  His other issue today include progressive hearing loss along with tinnitus.  This has been going on for last 10 years.  The problem started after he had a fail injury where he fell down on his face and left side in 2013.  He could not remember anything from the incident but reports that when he woke up he was in the hospital.  He was told that there was blood in his brain as well as his ear cannula.  He was then sent to rehabilitation few weeks but then he started having hearing issues since then.  He also reports to have memory issues during this time.  He denies having lost in his on surroundings issues with behavior,dizziness and  loss of consciousness ,seizure-like episode at this time.  He continues to smoke and has been smoking 1-2 packs of cigarettes per day for last 50 years.  He drinks beer on occasion but denied taking drugs.           Problem List:  Patient Active Problem List   Diagnosis    Pneumonia    Sepsis    Dyspnea    Cellulitis and abscess of buttock    AAA (abdominal aortic aneurysm)    Hyponatremia       ROS: Negative except as noted above.       Current Meds:  Current Outpatient Medications   Medication Sig Dispense Refill    albuterol  (PROVENTIL/VENTOLIN HFA) 90 mcg/actuation inhaler Inhale 2 puffs into the lungs every 6 (six) hours as needed for Shortness of Breath. (Patient not taking: Reported on 2/5/2024) 18 g 0    amLODIPine (NORVASC) 10 MG tablet Take 1 tablet (10 mg total) by mouth once daily. 90 tablet 0    aspirin (ECOTRIN) 81 MG EC tablet Take 1 tablet (81 mg total) by mouth once daily. 360 tablet 0    atorvastatin (LIPITOR) 80 MG tablet Take 1 tablet (80 mg total) by mouth once daily. 180 tablet 0    benzonatate (TESSALON) 100 MG capsule Take 100 mg by mouth.      lisinopriL 10 MG tablet Take 1 tablet (10 mg total) by mouth once daily. 90 tablet 0    promethazine-dextromethorphan (PROMETHAZINE-DM) 6.25-15 mg/5 mL Syrp Take 5 mLs by mouth 4 (four) times daily.       No current facility-administered medications for this visit.      PE:  BP: 124/72  Pulse: 77     Temp: 97.9 °F (36.6 °C)  Weight: 79.1 kg (174 lb 6.1 oz) Body mass index is 23.01 kg/m².    Wt Readings from Last 5 Encounters:   02/05/24 79.1 kg (174 lb 6.1 oz)   01/15/24 79.5 kg (175 lb 4.3 oz)   01/14/24 79.5 kg (175 lb 4.3 oz)     General appearance: alert and cooperative, not in acute distress  Head: normocephalic, without obvious abnormality, atraumatic  Eyes: conjunctivae/corneas clear. PERRL, EOM's intact.  Ears: clear tympanic membranes   Neck: no adenopathy, supple, symmetrical, trachea midline and thyroid not enlarged, symmetric, no tenderness/mass/nodules, no JVD  Throat: lips, mucosa, and tongue normal; teeth and gums normal; no thrush  Chest: no reproducible chest pain   Heart: regular rate and rhythm, S1, S2 normal, no murmur, click, rub or gallop  Lungs: unlabored respiration, bilateral equal air entry, normal vesicular breath sound heard, no wheezing, rhonchi   Abdomen: soft, non-tender, non-distended; bowel sounds +; no masses,  no organomegaly, no ascites   Extremities: normal, atraumatic, no cyanosis or edema noted B/L upper and lower extremities.  Skin:  skin color, texture, turgor normal. No rashes or lesions noted.  Neurologic: grossly intact      Lab:  Lab Results   Component Value Date    WBC 20.15 (H) 01/22/2024    HGB 11.5 (L) 01/22/2024    HCT 34.2 (L) 01/22/2024    MCV 89 01/22/2024     (H) 01/22/2024     02/05/2024    K 5.2 (H) 02/05/2024     02/05/2024    CO2 25 02/05/2024    BUN 17 02/05/2024     02/05/2024    CALCIUM 9.1 02/05/2024    AST 17 02/05/2024    ALT 9 (L) 02/05/2024       Impression:    ICD-10-CM ICD-9-CM    1. Encounter to establish care  Z76.89 V65.8 HEPATITIS C ANTIBODY      LIPID PANEL      HEMOGLOBIN A1C      COMPREHENSIVE METABOLIC PANEL      2. Infrarenal abdominal aortic aneurysm (AAA) without rupture  I71.43 441.4       3. PAD (peripheral artery disease)  I73.9 443.9 aspirin (ECOTRIN) 81 MG EC tablet      atorvastatin (LIPITOR) 80 MG tablet      4. Tobacco dependence syndrome  F17.200 305.1       5. Pneumonia of left lower lobe due to infectious organism  J18.9 486 X-Ray Chest PA And Lateral      6. Bilateral hearing loss, unspecified hearing loss type  H91.93 389.9 Ambulatory referral/consult to ENT      Ambulatory referral/consult to Audiology      7. Abnormal finding of blood chemistry, unspecified  R79.9 790.6 LIPID PANEL      HEMOGLOBIN A1C      1. Encounter to establish care  - HEPATITIS C ANTIBODY; Future  - LIPID PANEL; Future  - HEMOGLOBIN A1C; Future  - COMPREHENSIVE METABOLIC PANEL; Future    2. Infrarenal abdominal aortic aneurysm (AAA) without rupture  Ct abdomen and pelvis on 1/17/20274  Was seeing dr. Cornell morrison where he had endovascular repair done on 2/2/2016  Partially visualized known 5.3 cm maximum AP diameter infrarenal abdominal aortic aneurysm (not seen in its entirety) with aortic endovascular stent graft which is patent.  The aneurysm extends into the right common iliac artery where it demonstrates a 3.8 cm AP x 3.8 cm transverse diameter.  Graft is patent.  Extensive calcified  plaque throughout the vasculature.  Bilateral internal iliac arteries appear dilated/ectatic measuring 13 mm on the right and similar diameter on the left.  Abrupt caliber change in these vessels distally with significant stenoses possible.  There is filling of distal vessels.     3. PAD (peripheral artery disease)  Not on any medicaiton,  Starting him on aspirin, atorvastatin  - aspirin (ECOTRIN) 81 MG EC tablet; Take 1 tablet (81 mg total) by mouth once daily.  Dispense: 360 tablet; Refill: 0  - atorvastatin (LIPITOR) 80 MG tablet; Take 1 tablet (80 mg total) by mouth once daily.  Dispense: 180 tablet; Refill: 0    4. Tobacco dependence syndrome  Assistance with smoking cessation was offered, including:  [x]  Medications  []  Counseling  [x]  Printed Information on Smoking Cessation  [x]  Referral to a Smoking Cessation Program    Patient was counseled regarding smoking for 3-10 minutes.     5. Pneumonia of left lower lobe due to infectious organism    - X-Ray Chest PA And Lateral; Future    6. Bilateral hearing loss, unspecified hearing loss type  - Ambulatory referral/consult to ENT; Future  - Ambulatory referral/consult to Audiology; Future    7. Abnormal finding of blood chemistry, unspecified  - LIPID PANEL; Future  - HEMOGLOBIN A1C; Future      Future Appointments   Date Time Provider Department Center   2/26/2024 12:30 PM Hailey Pennington Au.D, [facility] AUDIO [facility]   2/26/2024  1:15 PM Noble Yu MD [facility] ENT [facility]   8/5/2024  7:00 AM Ammon Christina MD [facility]       Rtc in 6 months      Ammon Christina MD

## 2024-02-26 ENCOUNTER — OFFICE VISIT (OUTPATIENT)
Dept: OTOLARYNGOLOGY | Facility: CLINIC | Age: 69
End: 2024-02-26
Payer: MEDICARE

## 2024-02-26 ENCOUNTER — CLINICAL SUPPORT (OUTPATIENT)
Dept: AUDIOLOGY | Facility: CLINIC | Age: 69
End: 2024-02-26
Payer: MEDICARE

## 2024-02-26 DIAGNOSIS — H90.A22 SENSORINEURAL HEARING LOSS (SNHL) OF LEFT EAR WITH RESTRICTED HEARING OF RIGHT EAR: ICD-10-CM

## 2024-02-26 DIAGNOSIS — H93.13 TINNITUS AURIUM, BILATERAL: Primary | ICD-10-CM

## 2024-02-26 DIAGNOSIS — H69.93 DYSFUNCTION OF BOTH EUSTACHIAN TUBES: ICD-10-CM

## 2024-02-26 DIAGNOSIS — H90.3 SENSORINEURAL HEARING LOSS (SNHL) OF BOTH EARS: ICD-10-CM

## 2024-02-26 PROCEDURE — 99213 OFFICE O/P EST LOW 20 MIN: CPT | Mod: PBBFAC,27,25 | Performed by: OTOLARYNGOLOGY

## 2024-02-26 PROCEDURE — 99999 PR PBB SHADOW E&M-EST. PATIENT-LVL III: CPT | Mod: PBBFAC,,, | Performed by: OTOLARYNGOLOGY

## 2024-02-26 PROCEDURE — 99204 OFFICE O/P NEW MOD 45 MIN: CPT | Mod: S$PBB,,, | Performed by: OTOLARYNGOLOGY

## 2024-02-26 PROCEDURE — 92557 COMPREHENSIVE HEARING TEST: CPT | Mod: PBBFAC | Performed by: AUDIOLOGIST

## 2024-02-26 PROCEDURE — 99211 OFF/OP EST MAY X REQ PHY/QHP: CPT | Mod: PBBFAC,25 | Performed by: AUDIOLOGIST

## 2024-02-26 PROCEDURE — 92567 TYMPANOMETRY: CPT | Mod: PBBFAC | Performed by: AUDIOLOGIST

## 2024-02-26 PROCEDURE — 99999 PR PBB SHADOW E&M-EST. PATIENT-LVL I: CPT | Mod: PBBFAC,,, | Performed by: AUDIOLOGIST

## 2024-02-26 RX ORDER — FLUTICASONE PROPIONATE 50 MCG
2 SPRAY, SUSPENSION (ML) NASAL DAILY
Qty: 16 G | Refills: 5 | Status: SHIPPED | OUTPATIENT
Start: 2024-02-26

## 2024-02-26 NOTE — PROGRESS NOTES
Dallas Becker was seen 02/26/2024 for an audiological evaluation. Patient complains of decreased hearing in the left ear and constant bilateral tinnitus since 2013 after suffering a fall and trauma to left side of head. He also reports pressure in his left ear and is just getting over pneumonia. 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     Results reveal a mild-to-severe sensorineural hearing loss 250, 750-8000 Hz for the right ear, and  moderate-to-profound sensorineural hearing loss 250-8000 Hz for the left ear.   Speech Reception Thresholds were  25 dBHL for the right ear and 45 dBHL for the left ear.   Word recognition scores were good for the right ear and fair for the left ear.  Tympanograms were Type A, normal for the right ear and Type A, normal for the left ear.    Patient was counseled on the above findings.    We discussed that tinnitus is most often caused by a hearing loss, and that as the hair cells are damaged, either genetic or as a result of loud noise exposure, they then cause tinnitus. Some patients find that restricting the salt or caffeine in their diet helps, and there is also an OTC supplement, lipflavinoids, that some people find to be effective though their benefit is not fully proven. Tinnitus tends to be louder in times of stress and fatigue, and may decrease with time. Sound machines may also be an effective masking technique if needed at night.      Recommendations:  Follow-up with ENT, as scheduled.   Repeat audiological evaluation in one to two years to monitor hearing, or sooner if needed.  Consider a hearing aid consultation. Patient provided with clinic hearing aid information packet and a copy of audiogram.   Wear hearing protection devices when around loud noise.

## 2024-02-26 NOTE — PROGRESS NOTES
Referring Provider:    Ammon Christina Md  [address]  [place] [address]  Subjective:   Patient: Dallas Becker [id number], :1955   Visit date:2024 1:04 PM    Chief Complaint:  Hearing Loss (Pt is coming in today for hearing loss in both ears but mainly the left ear and ringing in both ears but mainly in the right one)    HPI:    Prior notes reviewed by myself.  Clinical documentation obtained by nursing staff reviewed.     68-year-old gentleman presents for evaluation of hearing loss and bilateral tinnitus.  The hearing loss has been progressive over many years.  He reports the tinnitus has symmetric, nonpulsatile and sounding like crickets.  He does have a history of loud noise exposure from prior occupation.  He does not have any history of otologic surgery.  He does have a distant history of head trauma but is unclear whether this is related.  No recent imaging      Objective:     Physical Exam:  Vitals:  There were no vitals taken for this visit.  General appearance:  Well developed, well nourished    Ears:  Otoscopy of external auditory canals and tympanic membranes was normal, clinical speech reception thresholds grossly intact, no mass/lesion of auricle.    Nose:  No masses/lesions of external nose, nasal mucosa, septum, and turbinates were within normal limits.    Mouth:  No mass/lesion of lips, teeth, gums, hard/soft palate, tongue, tonsils, or oropharynx.    Neck & Lymphatics:  No cervical lymphadenopathy, no neck mass/crepitus/ asymmetry, trachea is midline, no thyroid enlargement/tenderness/mass.        [x]  Data Reviewed:    Lab Results   Component Value Date    WBC 20.15 (H) 2024    HGB 11.5 (L) 2024    HCT 34.2 (L) 2024    MCV 89 2024    EOSINOPHIL 0.4 2024         [x]  Independent interpretation of test: asymmetric low to mid frequency SNHL, As> Ad     Media Information    File Link    Annotation on 2024 12:48 PM by Miguelina Kim,  CCC-A: AUDIOGRAM        Key Information    Document ID File Type Document Type Description   [id number] Annotation Annotation AUDIOGRAM     Import Information    Attached At Date Time User Dept   Encounter Level 2/26/2024 12:48 PM Miguelina Kim CCC-A [facility] Audiology     Encounter    Clinical Support on 2/26/24 with Miguelina Kim CCC-A             Assessment & Plan:   Tinnitus aurium, bilateral    Sensorineural hearing loss (SNHL) of left ear with restricted hearing of right ear  -     Ambulatory referral/consult to ENT    Dysfunction of both eustachian tubes  -     fluticasone propionate (FLONASE) 50 mcg/actuation nasal spray; 2 sprays (100 mcg total) by Each Nostril route once daily.  Dispense: 16 g; Refill: 5        He reports episodes where he feels like his ears are clogged and he experiences relief when they pop.  We discussed the concept Eustachian tube dysfunction.  His tympanograms on his testing today were type A.  I recommended that he start using Flonase and practice autoinsufflation.  We reviewed his audiogram in detail which does demonstrate an asymmetry in the low to mid frequencies.  We discussed options including dedicated imaging to rule out retrocochlear pathology as well as continued observation with serial audiograms.  He understands the risks, benefits and alternatives to this decision including the possibility of a missed retrocochlear lesion.  He would like to repeat an audiogram in 6 months.      Noble Yu M.D.  Department of Otolaryngology - Head & Neck Surgery  [address] [place] [address].  [place] [address]  P: [phone]  F: [phone]        DISCLAIMER: This note was prepared with [employer] voice recognition transcription software. Garbled syntax, mangled pronouns, and other bizarre constructions may be attributed to that software system. While efforts were made to correct any mistakes made by this voice recognition program, some errors and/or omissions may remain in  the note that were missed when the note was originally created.

## 2024-04-22 PROBLEM — A41.9 SEPSIS: Status: RESOLVED | Noted: 2024-01-16 | Resolved: 2024-04-22

## 2024-04-22 PROBLEM — J18.9 PNEUMONIA: Status: RESOLVED | Noted: 2024-01-16 | Resolved: 2024-04-22
